# Patient Record
Sex: MALE | Race: WHITE | NOT HISPANIC OR LATINO | Employment: OTHER | URBAN - METROPOLITAN AREA
[De-identification: names, ages, dates, MRNs, and addresses within clinical notes are randomized per-mention and may not be internally consistent; named-entity substitution may affect disease eponyms.]

---

## 2019-09-23 ENCOUNTER — HOSPITAL ENCOUNTER (INPATIENT)
Facility: HOSPITAL | Age: 59
LOS: 2 days | Discharge: HOME | DRG: 394 | End: 2019-09-25
Attending: FAMILY MEDICINE | Admitting: INTERNAL MEDICINE
Payer: MEDICAID

## 2019-09-23 DIAGNOSIS — B18.2 CHRONIC HEPATITIS C WITHOUT HEPATIC COMA: Chronic | ICD-10-CM

## 2019-09-23 DIAGNOSIS — K55.9 ISCHEMIC COLITIS: Chronic | ICD-10-CM

## 2019-09-23 DIAGNOSIS — K92.2 GASTROINTESTINAL HEMORRHAGE, UNSPECIFIED GASTROINTESTINAL HEMORRHAGE TYPE: Primary | ICD-10-CM

## 2019-09-23 DIAGNOSIS — R10.9 ABDOMINAL PAIN: ICD-10-CM

## 2019-09-23 DIAGNOSIS — K92.2 GI BLEED: ICD-10-CM

## 2019-09-23 LAB
ALBUMIN SERPL BCP-MCNC: 1.9 G/DL (ref 3.5–5.2)
ALP SERPL-CCNC: 67 U/L (ref 55–135)
ALT SERPL W/O P-5'-P-CCNC: 15 U/L (ref 10–44)
AMPHET+METHAMPHET UR QL: NEGATIVE
ANION GAP SERPL CALC-SCNC: 7 MMOL/L (ref 8–16)
AST SERPL-CCNC: 12 U/L (ref 10–40)
BARBITURATES UR QL SCN>200 NG/ML: NEGATIVE
BASOPHILS # BLD AUTO: 0.06 K/UL (ref 0–0.2)
BASOPHILS NFR BLD: 0.5 % (ref 0–1.9)
BENZODIAZ UR QL SCN>200 NG/ML: NEGATIVE
BILIRUB SERPL-MCNC: 0.3 MG/DL (ref 0.1–1)
BILIRUB UR QL STRIP: NEGATIVE
BUN SERPL-MCNC: 9 MG/DL (ref 6–20)
BZE UR QL SCN: NEGATIVE
CALCIUM SERPL-MCNC: 7.6 MG/DL (ref 8.7–10.5)
CANNABINOIDS UR QL SCN: NEGATIVE
CHLORIDE SERPL-SCNC: 109 MMOL/L (ref 95–110)
CLARITY UR: CLEAR
CO2 SERPL-SCNC: 24 MMOL/L (ref 23–29)
COLOR UR: YELLOW
CREAT SERPL-MCNC: 0.5 MG/DL (ref 0.5–1.4)
CREAT UR-MCNC: 10.8 MG/DL (ref 23–375)
DIFFERENTIAL METHOD: ABNORMAL
EOSINOPHIL # BLD AUTO: 0.3 K/UL (ref 0–0.5)
EOSINOPHIL NFR BLD: 2.3 % (ref 0–8)
ERYTHROCYTE [DISTWIDTH] IN BLOOD BY AUTOMATED COUNT: 17.1 % (ref 11.5–14.5)
EST. GFR  (AFRICAN AMERICAN): >60 ML/MIN/1.73 M^2
EST. GFR  (NON AFRICAN AMERICAN): >60 ML/MIN/1.73 M^2
GLUCOSE SERPL-MCNC: 57 MG/DL (ref 70–110)
GLUCOSE UR QL STRIP: NEGATIVE
HCT VFR BLD AUTO: 28.5 % (ref 40–54)
HGB BLD-MCNC: 8.5 G/DL (ref 14–18)
HGB UR QL STRIP: NEGATIVE
INR PPP: 1.1 (ref 0.8–1.2)
KETONES UR QL STRIP: NEGATIVE
LACTATE SERPL-SCNC: 1.4 MMOL/L (ref 0.5–2.2)
LEUKOCYTE ESTERASE UR QL STRIP: NEGATIVE
LIPASE SERPL-CCNC: 52 U/L (ref 4–60)
LYMPHOCYTES # BLD AUTO: 3.6 K/UL (ref 1–4.8)
LYMPHOCYTES NFR BLD: 31.9 % (ref 18–48)
MCH RBC QN AUTO: 24.6 PG (ref 27–31)
MCHC RBC AUTO-ENTMCNC: 29.8 G/DL (ref 32–36)
MCV RBC AUTO: 82 FL (ref 82–98)
METHADONE UR QL SCN>300 NG/ML: NEGATIVE
MONOCYTES # BLD AUTO: 1.3 K/UL (ref 0.3–1)
MONOCYTES NFR BLD: 11.6 % (ref 4–15)
NEUTROPHILS # BLD AUTO: 6.1 K/UL (ref 1.8–7.7)
NEUTROPHILS NFR BLD: 53.7 % (ref 38–73)
NITRITE UR QL STRIP: NEGATIVE
OPIATES UR QL SCN: NEGATIVE
PCP UR QL SCN>25 NG/ML: NEGATIVE
PH UR STRIP: 7 [PH] (ref 5–8)
PLATELET # BLD AUTO: 487 K/UL (ref 150–350)
PMV BLD AUTO: 8.8 FL (ref 9.2–12.9)
POTASSIUM SERPL-SCNC: 2.8 MMOL/L (ref 3.5–5.1)
PROT SERPL-MCNC: 6.4 G/DL (ref 6–8.4)
PROT UR QL STRIP: NEGATIVE
PROTHROMBIN TIME: 11.7 SEC (ref 9–12.5)
RBC # BLD AUTO: 3.46 M/UL (ref 4.6–6.2)
SODIUM SERPL-SCNC: 140 MMOL/L (ref 136–145)
SP GR UR STRIP: 1.01 (ref 1–1.03)
TOXICOLOGY INFORMATION: ABNORMAL
URN SPEC COLLECT METH UR: NORMAL
UROBILINOGEN UR STRIP-ACNC: NEGATIVE EU/DL
WBC # BLD AUTO: 11.36 K/UL (ref 3.9–12.7)

## 2019-09-23 PROCEDURE — 86703 HIV-1/HIV-2 1 RESULT ANTBDY: CPT

## 2019-09-23 PROCEDURE — 63600175 PHARM REV CODE 636 W HCPCS: Performed by: FAMILY MEDICINE

## 2019-09-23 PROCEDURE — 11000001 HC ACUTE MED/SURG PRIVATE ROOM

## 2019-09-23 PROCEDURE — 87040 BLOOD CULTURE FOR BACTERIA: CPT | Mod: 59

## 2019-09-23 PROCEDURE — 96361 HYDRATE IV INFUSION ADD-ON: CPT

## 2019-09-23 PROCEDURE — 84484 ASSAY OF TROPONIN QUANT: CPT

## 2019-09-23 PROCEDURE — 93010 ELECTROCARDIOGRAM REPORT: CPT | Mod: ,,, | Performed by: INTERNAL MEDICINE

## 2019-09-23 PROCEDURE — 36415 COLL VENOUS BLD VENIPUNCTURE: CPT

## 2019-09-23 PROCEDURE — 99285 EMERGENCY DEPT VISIT HI MDM: CPT | Mod: 25

## 2019-09-23 PROCEDURE — 84145 PROCALCITONIN (PCT): CPT

## 2019-09-23 PROCEDURE — 85610 PROTHROMBIN TIME: CPT

## 2019-09-23 PROCEDURE — 93005 ELECTROCARDIOGRAM TRACING: CPT

## 2019-09-23 PROCEDURE — 96374 THER/PROPH/DIAG INJ IV PUSH: CPT

## 2019-09-23 PROCEDURE — 83690 ASSAY OF LIPASE: CPT

## 2019-09-23 PROCEDURE — 80053 COMPREHEN METABOLIC PANEL: CPT

## 2019-09-23 PROCEDURE — 80307 DRUG TEST PRSMV CHEM ANLYZR: CPT

## 2019-09-23 PROCEDURE — 81003 URINALYSIS AUTO W/O SCOPE: CPT | Mod: 59

## 2019-09-23 PROCEDURE — 85025 COMPLETE CBC W/AUTO DIFF WBC: CPT

## 2019-09-23 PROCEDURE — 83605 ASSAY OF LACTIC ACID: CPT

## 2019-09-23 PROCEDURE — 82272 OCCULT BLD FECES 1-3 TESTS: CPT

## 2019-09-23 PROCEDURE — 83735 ASSAY OF MAGNESIUM: CPT

## 2019-09-23 PROCEDURE — 93010 EKG 12-LEAD: ICD-10-PCS | Mod: ,,, | Performed by: INTERNAL MEDICINE

## 2019-09-23 RX ORDER — METOCLOPRAMIDE HYDROCHLORIDE 5 MG/ML
10 INJECTION INTRAMUSCULAR; INTRAVENOUS
Status: COMPLETED | OUTPATIENT
Start: 2019-09-23 | End: 2019-09-23

## 2019-09-23 RX ORDER — POTASSIUM CHLORIDE 20 MEQ/15ML
20 SOLUTION ORAL ONCE
Status: COMPLETED | OUTPATIENT
Start: 2019-09-24 | End: 2019-09-24

## 2019-09-23 RX ADMIN — METOCLOPRAMIDE 10 MG: 5 INJECTION, SOLUTION INTRAMUSCULAR; INTRAVENOUS at 11:09

## 2019-09-23 RX ADMIN — SODIUM CHLORIDE 1770 ML: 0.9 INJECTION, SOLUTION INTRAVENOUS at 11:09

## 2019-09-24 PROBLEM — E87.6 HYPOKALEMIA: Status: ACTIVE | Noted: 2019-09-24

## 2019-09-24 PROBLEM — R52 INTRACTABLE PAIN: Status: ACTIVE | Noted: 2017-12-06

## 2019-09-24 PROBLEM — E16.2 HYPOGLYCEMIA: Status: ACTIVE | Noted: 2019-09-24

## 2019-09-24 PROBLEM — K55.9 ISCHEMIC COLITIS: Status: ACTIVE | Noted: 2019-07-01

## 2019-09-24 PROBLEM — S36.119A LIVER INJURY: Status: ACTIVE | Noted: 2019-07-31

## 2019-09-24 PROBLEM — K27.9 PEPTIC ULCER DISEASE: Status: ACTIVE | Noted: 2019-05-15

## 2019-09-24 PROBLEM — Z87.11 HISTORY OF PEPTIC ULCER: Status: ACTIVE | Noted: 2019-09-24

## 2019-09-24 PROBLEM — B18.2 CHRONIC HEPATITIS C WITHOUT HEPATIC COMA: Chronic | Status: ACTIVE | Noted: 2018-01-05

## 2019-09-24 PROBLEM — N13.8 BPH WITH OBSTRUCTION/LOWER URINARY TRACT SYMPTOMS: Status: ACTIVE | Noted: 2017-01-06

## 2019-09-24 PROBLEM — E44.0 MODERATE MALNUTRITION: Status: ACTIVE | Noted: 2019-09-24

## 2019-09-24 PROBLEM — F11.90 HEROIN USE: Status: ACTIVE | Noted: 2019-09-16

## 2019-09-24 PROBLEM — I73.9 PERIPHERAL ARTERIAL DISEASE: Status: ACTIVE | Noted: 2018-08-20

## 2019-09-24 PROBLEM — B18.2 CHRONIC HEPATITIS C WITHOUT HEPATIC COMA: Status: ACTIVE | Noted: 2018-01-05

## 2019-09-24 PROBLEM — N40.1 BPH WITH OBSTRUCTION/LOWER URINARY TRACT SYMPTOMS: Status: ACTIVE | Noted: 2017-01-06

## 2019-09-24 PROBLEM — K55.9 ISCHEMIC COLITIS: Chronic | Status: ACTIVE | Noted: 2019-07-01

## 2019-09-24 PROBLEM — K92.2 GI BLEED: Status: ACTIVE | Noted: 2019-09-24

## 2019-09-24 PROBLEM — G89.4 CHRONIC PAIN SYNDROME: Status: ACTIVE | Noted: 2017-09-11

## 2019-09-24 LAB
ABO + RH BLD: NORMAL
ALBUMIN SERPL BCP-MCNC: 2.1 G/DL (ref 3.5–5.2)
ALP SERPL-CCNC: 66 U/L (ref 55–135)
ALT SERPL W/O P-5'-P-CCNC: 19 U/L (ref 10–44)
ANION GAP SERPL CALC-SCNC: 6 MMOL/L (ref 8–16)
AST SERPL-CCNC: 18 U/L (ref 10–40)
BASOPHILS # BLD AUTO: 0.05 K/UL (ref 0–0.2)
BASOPHILS # BLD AUTO: 0.07 K/UL (ref 0–0.2)
BASOPHILS NFR BLD: 0.5 % (ref 0–1.9)
BASOPHILS NFR BLD: 0.6 % (ref 0–1.9)
BILIRUB SERPL-MCNC: 0.3 MG/DL (ref 0.1–1)
BLD GP AB SCN CELLS X3 SERPL QL: NORMAL
BUN SERPL-MCNC: 8 MG/DL (ref 6–20)
CALCIUM SERPL-MCNC: 7.8 MG/DL (ref 8.7–10.5)
CHLORIDE SERPL-SCNC: 107 MMOL/L (ref 95–110)
CO2 SERPL-SCNC: 26 MMOL/L (ref 23–29)
CREAT SERPL-MCNC: 0.5 MG/DL (ref 0.5–1.4)
DIFFERENTIAL METHOD: ABNORMAL
DIFFERENTIAL METHOD: ABNORMAL
EOSINOPHIL # BLD AUTO: 0.2 K/UL (ref 0–0.5)
EOSINOPHIL # BLD AUTO: 0.2 K/UL (ref 0–0.5)
EOSINOPHIL NFR BLD: 1.4 % (ref 0–8)
EOSINOPHIL NFR BLD: 1.8 % (ref 0–8)
ERYTHROCYTE [DISTWIDTH] IN BLOOD BY AUTOMATED COUNT: 17.4 % (ref 11.5–14.5)
ERYTHROCYTE [DISTWIDTH] IN BLOOD BY AUTOMATED COUNT: 17.6 % (ref 11.5–14.5)
EST. GFR  (AFRICAN AMERICAN): >60 ML/MIN/1.73 M^2
EST. GFR  (NON AFRICAN AMERICAN): >60 ML/MIN/1.73 M^2
GLUCOSE SERPL-MCNC: 76 MG/DL (ref 70–110)
HCT VFR BLD AUTO: 32.5 % (ref 40–54)
HCT VFR BLD AUTO: 34.8 % (ref 40–54)
HGB BLD-MCNC: 10.2 G/DL (ref 14–18)
HGB BLD-MCNC: 9.7 G/DL (ref 14–18)
HIV 1+2 AB+HIV1 P24 AG SERPL QL IA: NEGATIVE
INR PPP: 1 (ref 0.8–1.2)
LACTATE SERPL-SCNC: 1.4 MMOL/L (ref 0.5–2.2)
LYMPHOCYTES # BLD AUTO: 2.6 K/UL (ref 1–4.8)
LYMPHOCYTES # BLD AUTO: 2.9 K/UL (ref 1–4.8)
LYMPHOCYTES NFR BLD: 24.2 % (ref 18–48)
LYMPHOCYTES NFR BLD: 25 % (ref 18–48)
MAGNESIUM SERPL-MCNC: 1.3 MG/DL (ref 1.6–2.6)
MAGNESIUM SERPL-MCNC: 1.3 MG/DL (ref 1.6–2.6)
MCH RBC QN AUTO: 24.2 PG (ref 27–31)
MCH RBC QN AUTO: 24.3 PG (ref 27–31)
MCHC RBC AUTO-ENTMCNC: 29.3 G/DL (ref 32–36)
MCHC RBC AUTO-ENTMCNC: 29.8 G/DL (ref 32–36)
MCV RBC AUTO: 82 FL (ref 82–98)
MCV RBC AUTO: 83 FL (ref 82–98)
MONOCYTES # BLD AUTO: 0.9 K/UL (ref 0.3–1)
MONOCYTES # BLD AUTO: 1 K/UL (ref 0.3–1)
MONOCYTES NFR BLD: 8.3 % (ref 4–15)
MONOCYTES NFR BLD: 8.5 % (ref 4–15)
NEUTROPHILS # BLD AUTO: 7 K/UL (ref 1.8–7.7)
NEUTROPHILS # BLD AUTO: 7.4 K/UL (ref 1.8–7.7)
NEUTROPHILS NFR BLD: 64.6 % (ref 38–73)
NEUTROPHILS NFR BLD: 65.8 % (ref 38–73)
OB PNL STL: POSITIVE
PHOSPHATE SERPL-MCNC: 2.9 MG/DL (ref 2.7–4.5)
PLATELET # BLD AUTO: 378 K/UL (ref 150–350)
PLATELET # BLD AUTO: 392 K/UL (ref 150–350)
PMV BLD AUTO: 8.9 FL (ref 9.2–12.9)
PMV BLD AUTO: 8.9 FL (ref 9.2–12.9)
POCT GLUCOSE: 69 MG/DL (ref 70–110)
POCT GLUCOSE: 77 MG/DL (ref 70–110)
POCT GLUCOSE: 77 MG/DL (ref 70–110)
POCT GLUCOSE: 97 MG/DL (ref 70–110)
POTASSIUM SERPL-SCNC: 3.9 MMOL/L (ref 3.5–5.1)
PROCALCITONIN SERPL IA-MCNC: 0.06 NG/ML
PROT SERPL-MCNC: 6.9 G/DL (ref 6–8.4)
PROTHROMBIN TIME: 10.8 SEC (ref 9–12.5)
RBC # BLD AUTO: 3.99 M/UL (ref 4.6–6.2)
RBC # BLD AUTO: 4.22 M/UL (ref 4.6–6.2)
SODIUM SERPL-SCNC: 139 MMOL/L (ref 136–145)
TROPONIN I SERPL DL<=0.01 NG/ML-MCNC: 0.04 NG/ML (ref 0–0.03)
WBC # BLD AUTO: 10.65 K/UL (ref 3.9–12.7)
WBC # BLD AUTO: 11.5 K/UL (ref 3.9–12.7)

## 2019-09-24 PROCEDURE — 83605 ASSAY OF LACTIC ACID: CPT

## 2019-09-24 PROCEDURE — 25000003 PHARM REV CODE 250: Performed by: INTERNAL MEDICINE

## 2019-09-24 PROCEDURE — 97802 MEDICAL NUTRITION INDIV IN: CPT

## 2019-09-24 PROCEDURE — C9113 INJ PANTOPRAZOLE SODIUM, VIA: HCPCS | Performed by: NURSE PRACTITIONER

## 2019-09-24 PROCEDURE — 99232 SBSQ HOSP IP/OBS MODERATE 35: CPT | Mod: ,,, | Performed by: COLON & RECTAL SURGERY

## 2019-09-24 PROCEDURE — 99232 SBSQ HOSP IP/OBS MODERATE 35: CPT | Mod: ,,, | Performed by: INTERNAL MEDICINE

## 2019-09-24 PROCEDURE — 99232 PR SUBSEQUENT HOSPITAL CARE,LEVL II: ICD-10-PCS | Mod: ,,, | Performed by: INTERNAL MEDICINE

## 2019-09-24 PROCEDURE — 85025 COMPLETE CBC W/AUTO DIFF WBC: CPT | Mod: 91

## 2019-09-24 PROCEDURE — 90471 IMMUNIZATION ADMIN: CPT | Performed by: INTERNAL MEDICINE

## 2019-09-24 PROCEDURE — 99232 PR SUBSEQUENT HOSPITAL CARE,LEVL II: ICD-10-PCS | Mod: ,,, | Performed by: COLON & RECTAL SURGERY

## 2019-09-24 PROCEDURE — 63600175 PHARM REV CODE 636 W HCPCS: Performed by: FAMILY MEDICINE

## 2019-09-24 PROCEDURE — 25000003 PHARM REV CODE 250: Performed by: NURSE PRACTITIONER

## 2019-09-24 PROCEDURE — 20000000 HC ICU ROOM

## 2019-09-24 PROCEDURE — 80053 COMPREHEN METABOLIC PANEL: CPT

## 2019-09-24 PROCEDURE — 84100 ASSAY OF PHOSPHORUS: CPT

## 2019-09-24 PROCEDURE — 90670 PCV13 VACCINE IM: CPT | Performed by: INTERNAL MEDICINE

## 2019-09-24 PROCEDURE — 83735 ASSAY OF MAGNESIUM: CPT

## 2019-09-24 PROCEDURE — 63600175 PHARM REV CODE 636 W HCPCS: Performed by: INTERNAL MEDICINE

## 2019-09-24 PROCEDURE — 25000003 PHARM REV CODE 250: Performed by: FAMILY MEDICINE

## 2019-09-24 PROCEDURE — 94640 AIRWAY INHALATION TREATMENT: CPT

## 2019-09-24 PROCEDURE — 63600175 PHARM REV CODE 636 W HCPCS: Performed by: NURSE PRACTITIONER

## 2019-09-24 PROCEDURE — 36415 COLL VENOUS BLD VENIPUNCTURE: CPT

## 2019-09-24 PROCEDURE — 25000242 PHARM REV CODE 250 ALT 637 W/ HCPCS: Performed by: INTERNAL MEDICINE

## 2019-09-24 PROCEDURE — 86850 RBC ANTIBODY SCREEN: CPT

## 2019-09-24 PROCEDURE — 85610 PROTHROMBIN TIME: CPT

## 2019-09-24 RX ORDER — SPIRONOLACTONE 50 MG/1
50 TABLET, FILM COATED ORAL
Status: ON HOLD | COMMUNITY
Start: 2019-09-16 | End: 2019-09-24 | Stop reason: CLARIF

## 2019-09-24 RX ORDER — OXYCODONE HYDROCHLORIDE 5 MG/1
5 TABLET ORAL EVERY 4 HOURS PRN
Status: DISCONTINUED | OUTPATIENT
Start: 2019-09-24 | End: 2019-09-24

## 2019-09-24 RX ORDER — SODIUM CHLORIDE, SODIUM LACTATE, POTASSIUM CHLORIDE, CALCIUM CHLORIDE 600; 310; 30; 20 MG/100ML; MG/100ML; MG/100ML; MG/100ML
INJECTION, SOLUTION INTRAVENOUS CONTINUOUS
Status: DISCONTINUED | OUTPATIENT
Start: 2019-09-24 | End: 2019-09-24

## 2019-09-24 RX ORDER — MORPHINE SULFATE 4 MG/ML
4 INJECTION, SOLUTION INTRAMUSCULAR; INTRAVENOUS
Status: COMPLETED | OUTPATIENT
Start: 2019-09-24 | End: 2019-09-24

## 2019-09-24 RX ORDER — MISOPROSTOL 200 UG/1
100 TABLET ORAL EVERY 6 HOURS
Status: DISCONTINUED | OUTPATIENT
Start: 2019-09-24 | End: 2019-09-25 | Stop reason: HOSPADM

## 2019-09-24 RX ORDER — ASPIRIN 325 MG/1
100 TABLET, FILM COATED ORAL
COMMUNITY
Start: 2019-08-04

## 2019-09-24 RX ORDER — IPRATROPIUM BROMIDE AND ALBUTEROL SULFATE 2.5; .5 MG/3ML; MG/3ML
3 SOLUTION RESPIRATORY (INHALATION) EVERY 4 HOURS PRN
Status: DISCONTINUED | OUTPATIENT
Start: 2019-09-24 | End: 2019-09-25 | Stop reason: HOSPADM

## 2019-09-24 RX ORDER — ALBUTEROL SULFATE 90 UG/1
2 AEROSOL, METERED RESPIRATORY (INHALATION) EVERY 6 HOURS PRN
COMMUNITY
Start: 2019-09-16

## 2019-09-24 RX ORDER — TAMSULOSIN HYDROCHLORIDE 0.4 MG/1
0.4 CAPSULE ORAL DAILY
COMMUNITY
Start: 2019-09-16

## 2019-09-24 RX ORDER — GABAPENTIN 100 MG/1
200 CAPSULE ORAL 3 TIMES DAILY
Status: DISCONTINUED | OUTPATIENT
Start: 2019-09-24 | End: 2019-09-25 | Stop reason: HOSPADM

## 2019-09-24 RX ORDER — ACETAMINOPHEN 325 MG/1
650 TABLET ORAL EVERY 6 HOURS PRN
Status: DISCONTINUED | OUTPATIENT
Start: 2019-09-24 | End: 2019-09-25 | Stop reason: HOSPADM

## 2019-09-24 RX ORDER — PANTOPRAZOLE SODIUM 40 MG/10ML
40 INJECTION, POWDER, LYOPHILIZED, FOR SOLUTION INTRAVENOUS 2 TIMES DAILY
Status: DISCONTINUED | OUTPATIENT
Start: 2019-09-24 | End: 2019-09-24

## 2019-09-24 RX ORDER — IBUPROFEN 200 MG
24 TABLET ORAL
Status: DISCONTINUED | OUTPATIENT
Start: 2019-09-24 | End: 2019-09-25 | Stop reason: HOSPADM

## 2019-09-24 RX ORDER — OXYCODONE HYDROCHLORIDE 5 MG/1
10 TABLET ORAL EVERY 4 HOURS PRN
Status: DISCONTINUED | OUTPATIENT
Start: 2019-09-24 | End: 2019-09-25 | Stop reason: HOSPADM

## 2019-09-24 RX ORDER — PANTOPRAZOLE SODIUM 40 MG/10ML
80 INJECTION, POWDER, LYOPHILIZED, FOR SOLUTION INTRAVENOUS ONCE
Status: DISCONTINUED | OUTPATIENT
Start: 2019-09-24 | End: 2019-09-24

## 2019-09-24 RX ORDER — PANTOPRAZOLE SODIUM 40 MG/1
40 TABLET, DELAYED RELEASE ORAL DAILY
Status: DISCONTINUED | OUTPATIENT
Start: 2019-09-24 | End: 2019-09-25

## 2019-09-24 RX ORDER — PANTOPRAZOLE SODIUM 40 MG/1
40 TABLET, DELAYED RELEASE ORAL DAILY
COMMUNITY
Start: 2019-09-16 | End: 2020-09-15

## 2019-09-24 RX ORDER — OXYCODONE AND ACETAMINOPHEN 5; 325 MG/1; MG/1
TABLET ORAL
Refills: 0 | Status: ON HOLD | COMMUNITY
Start: 2019-09-14 | End: 2019-09-25 | Stop reason: HOSPADM

## 2019-09-24 RX ORDER — ATORVASTATIN CALCIUM 10 MG/1
20 TABLET, FILM COATED ORAL NIGHTLY
Status: DISCONTINUED | OUTPATIENT
Start: 2019-09-24 | End: 2019-09-25 | Stop reason: HOSPADM

## 2019-09-24 RX ORDER — POTASSIUM CHLORIDE 20 MEQ/15ML
40 SOLUTION ORAL ONCE
Status: DISCONTINUED | OUTPATIENT
Start: 2019-09-24 | End: 2019-09-24

## 2019-09-24 RX ORDER — POTASSIUM CHLORIDE 20 MEQ/15ML
40 SOLUTION ORAL EVERY 4 HOURS
Status: DISPENSED | OUTPATIENT
Start: 2019-09-24 | End: 2019-09-24

## 2019-09-24 RX ORDER — ARFORMOTEROL TARTRATE 15 UG/2ML
15 SOLUTION RESPIRATORY (INHALATION) 2 TIMES DAILY
Status: DISCONTINUED | OUTPATIENT
Start: 2019-09-24 | End: 2019-09-25 | Stop reason: HOSPADM

## 2019-09-24 RX ORDER — TAMSULOSIN HYDROCHLORIDE 0.4 MG/1
0.4 CAPSULE ORAL DAILY
Status: DISCONTINUED | OUTPATIENT
Start: 2019-09-24 | End: 2019-09-25 | Stop reason: HOSPADM

## 2019-09-24 RX ORDER — BUDESONIDE 0.5 MG/2ML
0.5 INHALANT ORAL EVERY 12 HOURS
Status: DISCONTINUED | OUTPATIENT
Start: 2019-09-24 | End: 2019-09-25 | Stop reason: HOSPADM

## 2019-09-24 RX ORDER — GABAPENTIN 100 MG/1
200 CAPSULE ORAL 3 TIMES DAILY
COMMUNITY
Start: 2019-09-16 | End: 2020-03-14

## 2019-09-24 RX ORDER — DULOXETIN HYDROCHLORIDE 20 MG/1
20 CAPSULE, DELAYED RELEASE ORAL
Status: ON HOLD | COMMUNITY
Start: 2019-09-16 | End: 2019-09-24 | Stop reason: CLARIF

## 2019-09-24 RX ORDER — ONDANSETRON 2 MG/ML
4 INJECTION INTRAMUSCULAR; INTRAVENOUS EVERY 6 HOURS PRN
Status: DISCONTINUED | OUTPATIENT
Start: 2019-09-24 | End: 2019-09-25 | Stop reason: HOSPADM

## 2019-09-24 RX ORDER — DULOXETIN HYDROCHLORIDE 20 MG/1
20 CAPSULE, DELAYED RELEASE ORAL DAILY
Status: DISCONTINUED | OUTPATIENT
Start: 2019-09-24 | End: 2019-09-25 | Stop reason: HOSPADM

## 2019-09-24 RX ORDER — THIAMINE HCL 100 MG
100 TABLET ORAL DAILY
Status: DISCONTINUED | OUTPATIENT
Start: 2019-09-24 | End: 2019-09-25 | Stop reason: HOSPADM

## 2019-09-24 RX ORDER — MAGNESIUM SULFATE HEPTAHYDRATE 40 MG/ML
2 INJECTION, SOLUTION INTRAVENOUS ONCE
Status: COMPLETED | OUTPATIENT
Start: 2019-09-24 | End: 2019-09-24

## 2019-09-24 RX ORDER — SULFAMETHOXAZOLE AND TRIMETHOPRIM 400; 80 MG/1; MG/1
TABLET ORAL
Status: ON HOLD | COMMUNITY
Start: 2019-09-05 | End: 2019-09-25 | Stop reason: HOSPADM

## 2019-09-24 RX ORDER — IBUPROFEN 200 MG
16 TABLET ORAL
Status: DISCONTINUED | OUTPATIENT
Start: 2019-09-24 | End: 2019-09-25 | Stop reason: HOSPADM

## 2019-09-24 RX ORDER — MISOPROSTOL 100 UG/1
100 TABLET ORAL EVERY 6 HOURS
Status: DISCONTINUED | OUTPATIENT
Start: 2019-09-24 | End: 2019-09-24

## 2019-09-24 RX ORDER — SPIRONOLACTONE 25 MG/1
50 TABLET ORAL DAILY
Status: DISCONTINUED | OUTPATIENT
Start: 2019-09-24 | End: 2019-09-25 | Stop reason: HOSPADM

## 2019-09-24 RX ORDER — GLUCAGON 1 MG
1 KIT INJECTION
Status: DISCONTINUED | OUTPATIENT
Start: 2019-09-24 | End: 2019-09-25 | Stop reason: HOSPADM

## 2019-09-24 RX ORDER — ATORVASTATIN CALCIUM 20 MG/1
20 TABLET, FILM COATED ORAL
COMMUNITY
Start: 2019-09-16 | End: 2020-09-15

## 2019-09-24 RX ADMIN — PANTOPRAZOLE SODIUM 40 MG: 40 INJECTION, POWDER, FOR SOLUTION INTRAVENOUS at 03:09

## 2019-09-24 RX ADMIN — BUDESONIDE 0.5 MG: 0.5 SUSPENSION RESPIRATORY (INHALATION) at 07:09

## 2019-09-24 RX ADMIN — OXYCODONE HYDROCHLORIDE 10 MG: 5 TABLET ORAL at 07:09

## 2019-09-24 RX ADMIN — MISOPROSTOL 200 MCG: 200 TABLET ORAL at 08:09

## 2019-09-24 RX ADMIN — PNEUMOCOCCAL 13-VALENT CONJUGATE VACCINE 0.5 ML: 2.2; 2.2; 2.2; 2.2; 2.2; 4.4; 2.2; 2.2; 2.2; 2.2; 2.2; 2.2; 2.2 INJECTION, SUSPENSION INTRAMUSCULAR at 05:09

## 2019-09-24 RX ADMIN — SODIUM CHLORIDE, SODIUM LACTATE, POTASSIUM CHLORIDE, AND CALCIUM CHLORIDE: .6; .31; .03; .02 INJECTION, SOLUTION INTRAVENOUS at 03:09

## 2019-09-24 RX ADMIN — OXYCODONE HYDROCHLORIDE 10 MG: 5 TABLET ORAL at 11:09

## 2019-09-24 RX ADMIN — OXYCODONE HYDROCHLORIDE 10 MG: 5 TABLET ORAL at 03:09

## 2019-09-24 RX ADMIN — MORPHINE SULFATE 4 MG: 4 INJECTION, SOLUTION INTRAMUSCULAR; INTRAVENOUS at 12:09

## 2019-09-24 RX ADMIN — MAGNESIUM SULFATE IN WATER 2 G: 40 INJECTION, SOLUTION INTRAVENOUS at 09:09

## 2019-09-24 RX ADMIN — DULOXETINE HYDROCHLORIDE 20 MG: 20 CAPSULE, DELAYED RELEASE ORAL at 09:09

## 2019-09-24 RX ADMIN — ATORVASTATIN CALCIUM 20 MG: 10 TABLET, FILM COATED ORAL at 08:09

## 2019-09-24 RX ADMIN — GABAPENTIN 200 MG: 100 CAPSULE ORAL at 08:09

## 2019-09-24 RX ADMIN — POTASSIUM CHLORIDE 20 MEQ: 20 SOLUTION ORAL at 12:09

## 2019-09-24 RX ADMIN — ARFORMOTEROL TARTRATE 15 MCG: 15 SOLUTION RESPIRATORY (INHALATION) at 07:09

## 2019-09-24 RX ADMIN — Medication 100 MG: at 09:09

## 2019-09-24 RX ADMIN — PANTOPRAZOLE SODIUM 40 MG: 40 TABLET, DELAYED RELEASE ORAL at 09:09

## 2019-09-24 RX ADMIN — POTASSIUM CHLORIDE 40 MEQ: 20 SOLUTION ORAL at 03:09

## 2019-09-24 RX ADMIN — MISOPROSTOL 200 MCG: 200 TABLET ORAL at 11:09

## 2019-09-24 RX ADMIN — GABAPENTIN 200 MG: 100 CAPSULE ORAL at 03:09

## 2019-09-24 RX ADMIN — SPIRONOLACTONE 50 MG: 25 TABLET ORAL at 03:09

## 2019-09-24 RX ADMIN — GABAPENTIN 200 MG: 100 CAPSULE ORAL at 09:09

## 2019-09-24 RX ADMIN — TAMSULOSIN HYDROCHLORIDE 0.4 MG: 0.4 CAPSULE ORAL at 09:09

## 2019-09-24 NOTE — SUBJECTIVE & OBJECTIVE
Past Medical History:   Diagnosis Date    COPD (chronic obstructive pulmonary disease)     GERD (gastroesophageal reflux disease)     History of necrotic bowel 07/2019    Hypercholesteremia     Peptic ulcer     Shoulder pain        Past Surgical History:   Procedure Laterality Date    BACK SURGERY      COLECTOMY  07/2019    COLON SURGERY      COLOSTOMY  07/2019    LUNG BIOPSY      NECK SURGERY      peptic ulcer surgery         Review of patient's allergies indicates:   Allergen Reactions    Asa [aspirin]     Nsaids (non-steroidal anti-inflammatory drug)     Tramadol        No current facility-administered medications on file prior to encounter.      Current Outpatient Medications on File Prior to Encounter   Medication Sig    albuterol (PROVENTIL/VENTOLIN HFA) 90 mcg/actuation inhaler Inhale 2 puffs into the lungs every 6 (six) hours as needed.    atorvastatin (LIPITOR) 20 MG tablet Take 20 mg by mouth.    DULoxetine (CYMBALTA) 20 MG capsule Take 20 mg by mouth.    gabapentin (NEURONTIN) 100 MG capsule Take 200 mg by mouth.    pantoprazole (PROTONIX) 40 MG tablet Take 40 mg by mouth.    spironolactone (ALDACTONE) 50 MG tablet Take 50 mg by mouth.    tamsulosin (FLOMAX) 0.4 mg Cap Take 0.4 mg by mouth.    thiamine mononitrate, vit B1, (VITAMIN B-1, MONONITRATE,) 100 mg Tab Take 100 mg by mouth.    umeclidinium-vilanterol (ANORO ELLIPTA) 62.5-25 mcg/actuation DsDv Inhale 1 puff into the lungs.    fentaNYL (DURAGESIC) 75 mcg/hr Place 1 patch onto the skin every 72 hours.    hydrocodone-acetaminophen 10-325mg (NORCO)  mg Tab Take by mouth.    methocarbamol (ROBAXIN) 750 MG Tab Take 500 mg by mouth 4 (four) times daily.    oxyCODONE-acetaminophen (PERCOCET) 5-325 mg per tablet TK 1 T PO  Q 12 H PRN P FOR UP TO 5 DAYS    sulfamethoxazole-trimethoprim 400-80mg (BACTRIM,SEPTRA) 400-80 mg per tablet     [DISCONTINUED] ALBUTEROL INHL Inhale into the lungs.    [DISCONTINUED] ranitidine  (ZANTAC) 150 MG capsule Take 150 mg by mouth 2 (two) times daily.    [DISCONTINUED] SIMVASTATIN (ZOCOR ORAL) Take by mouth.     Family History     Reviewed and not pertinent.        Tobacco Use    Smoking status: Current Every Day Smoker     Packs/day: 1.00     Types: Cigarettes    Smokeless tobacco: Never Used   Substance and Sexual Activity    Alcohol use: No    Drug use: No    Sexual activity: Not on file     Review of Systems   Constitutional: Positive for diaphoresis. Negative for appetite change, chills, fatigue, fever and unexpected weight change.   HENT: Negative for congestion and sore throat.    Respiratory: Negative for cough, chest tightness, shortness of breath and wheezing.    Cardiovascular: Negative for chest pain, palpitations and leg swelling.   Gastrointestinal: Positive for abdominal pain and blood in stool. Negative for abdominal distention, constipation, diarrhea, nausea, rectal pain and vomiting.   Genitourinary: Negative for decreased urine volume, difficulty urinating, dysuria, flank pain, frequency, hematuria and urgency.   Musculoskeletal: Negative for arthralgias, back pain and myalgias.   Skin: Negative for pallor, rash and wound.   Neurological: Negative for dizziness, syncope, weakness, light-headedness, numbness and headaches.   Psychiatric/Behavioral: Negative for confusion. The patient is not nervous/anxious.    All other systems reviewed and are negative.    Objective:     Vital Signs (Most Recent):  Temp: 99 °F (37.2 °C) (09/23/19 2140)  Pulse: 98 (09/23/19 2346)  Resp: 15 (09/23/19 2346)  BP: 139/85 (09/23/19 2346)  SpO2: 97 % (09/23/19 2346) Vital Signs (24h Range):  Temp:  [99 °F (37.2 °C)] 99 °F (37.2 °C)  Pulse:  [] 98  Resp:  [15-24] 15  SpO2:  [97 %-98 %] 97 %  BP: (139-154)/() 139/85     Weight: 59 kg (130 lb 1.1 oz)  Body mass index is 20.99 kg/m².    Physical Exam   Constitutional: He is oriented to person, place, and time. He appears well-developed and  well-nourished. No distress.   HENT:   Head: Normocephalic and atraumatic.   Eyes: Conjunctivae are normal.   PERRL; EOM intact.   Neck: Normal range of motion. Neck supple.   Cardiovascular: Normal rate, regular rhythm, S1 normal, S2 normal and intact distal pulses.  No extrasystoles are present. Exam reveals no gallop.   No murmur heard.  Pulses:       Radial pulses are 2+ on the right side, and 2+ on the left side.        Dorsalis pedis pulses are 2+ on the right side, and 2+ on the left side.        Posterior tibial pulses are 2+ on the right side, and 2+ on the left side.   Pulmonary/Chest: Effort normal and breath sounds normal. No accessory muscle usage. No tachypnea. No respiratory distress. He has no wheezes. He has no rhonchi. He has no rales.   Abdominal: Soft. Bowel sounds are normal. He exhibits no distension. There is no hepatosplenomegaly. There is tenderness. There is no rigidity, no rebound, no guarding, no CVA tenderness and negative Tolliver's sign. No hernia.   LLQ colostomy with brown stool noted, stoma pink.  Diffuse abdominal tenderness.   Musculoskeletal: Normal range of motion. He exhibits no edema, tenderness or deformity.   Neurological: He is alert and oriented to person, place, and time. No cranial nerve deficit or sensory deficit.   Skin: Skin is warm, dry and intact. Capillary refill takes less than 2 seconds. No rash noted. He is not diaphoretic. No cyanosis or erythema.   Psychiatric: He has a normal mood and affect. His speech is normal and behavior is normal. Cognition and memory are normal.   Nursing note and vitals reviewed.          Significant Labs:  Results for orders placed or performed during the hospital encounter of 09/23/19   HIV 1/2 Ag/Ab (4th Gen)   Result Value Ref Range    HIV 1/2 Ag/Ab Negative Negative   CBC auto differential   Result Value Ref Range    WBC 11.36 3.90 - 12.70 K/uL    RBC 3.46 (L) 4.60 - 6.20 M/uL    Hemoglobin 8.5 (L) 14.0 - 18.0 g/dL    Hematocrit  28.5 (L) 40.0 - 54.0 %    Mean Corpuscular Volume 82 82 - 98 fL    Mean Corpuscular Hemoglobin 24.6 (L) 27.0 - 31.0 pg    Mean Corpuscular Hemoglobin Conc 29.8 (L) 32.0 - 36.0 g/dL    RDW 17.1 (H) 11.5 - 14.5 %    Platelets 487 (H) 150 - 350 K/uL    MPV 8.8 (L) 9.2 - 12.9 fL    Gran # (ANC) 6.1 1.8 - 7.7 K/uL    Lymph # 3.6 1.0 - 4.8 K/uL    Mono # 1.3 (H) 0.3 - 1.0 K/uL    Eos # 0.3 0.0 - 0.5 K/uL    Baso # 0.06 0.00 - 0.20 K/uL    Gran% 53.7 38.0 - 73.0 %    Lymph% 31.9 18.0 - 48.0 %    Mono% 11.6 4.0 - 15.0 %    Eosinophil% 2.3 0.0 - 8.0 %    Basophil% 0.5 0.0 - 1.9 %    Differential Method Automated    Comprehensive metabolic panel   Result Value Ref Range    Sodium 140 136 - 145 mmol/L    Potassium 2.8 (L) 3.5 - 5.1 mmol/L    Chloride 109 95 - 110 mmol/L    CO2 24 23 - 29 mmol/L    Glucose 57 (L) 70 - 110 mg/dL    BUN, Bld 9 6 - 20 mg/dL    Creatinine 0.5 0.5 - 1.4 mg/dL    Calcium 7.6 (L) 8.7 - 10.5 mg/dL    Total Protein 6.4 6.0 - 8.4 g/dL    Albumin 1.9 (L) 3.5 - 5.2 g/dL    Total Bilirubin 0.3 0.1 - 1.0 mg/dL    Alkaline Phosphatase 67 55 - 135 U/L    AST 12 10 - 40 U/L    ALT 15 10 - 44 U/L    Anion Gap 7 (L) 8 - 16 mmol/L    eGFR if African American >60 >60 mL/min/1.73 m^2    eGFR if non African American >60 >60 mL/min/1.73 m^2   Lactic acid, plasma #1   Result Value Ref Range    Lactate (Lactic Acid) 1.4 0.5 - 2.2 mmol/L   Urinalysis, Reflex to Urine Culture Urine, Clean Catch   Result Value Ref Range    Specimen UA Urine, Clean Catch     Color, UA Yellow Yellow, Straw, Rebekah    Appearance, UA Clear Clear    pH, UA 7.0 5.0 - 8.0    Specific Gravity, UA 1.010 1.005 - 1.030    Protein, UA Negative Negative    Glucose, UA Negative Negative    Ketones, UA Negative Negative    Bilirubin (UA) Negative Negative    Occult Blood UA Negative Negative    Nitrite, UA Negative Negative    Urobilinogen, UA Negative <2.0 EU/dL    Leukocytes, UA Negative Negative   Procalcitonin   Result Value Ref Range    Procalcitonin  0.06 <0.25 ng/mL   Lipase   Result Value Ref Range    Lipase 52 4 - 60 U/L   Protime-INR   Result Value Ref Range    Prothrombin Time 11.7 9.0 - 12.5 sec    INR 1.1 0.8 - 1.2   Drug screen panel, emergency   Result Value Ref Range    Benzodiazepines Negative     Methadone metabolites Negative     Cocaine (Metab.) Negative     Opiate Scrn, Ur Negative     Barbiturate Screen, Ur Negative     Amphetamine Screen, Ur Negative     THC Negative     Phencyclidine Negative     Creatinine, Random Ur 10.8 (L) 23.0 - 375.0 mg/dL    Toxicology Information SEE COMMENT    Occult blood x 1, stool   Result Value Ref Range    Occult Blood Positive (A) Negative   Type & Screen   Result Value Ref Range    Group & Rh O POS     Indirect Paul NEG       All pertinent labs within the past 24 hours have been reviewed.    Significant Imaging:  Imaging Results          X-Ray Chest AP Portable (Final result)  Result time 09/23/19 23:36:35    Final result by Glenn Gomez MD (Timothy) (09/23/19 23:36:35)                 Impression:      Mild interstitial scarring noted bilaterally.  No definite acute infiltrates.      Electronically signed by: Glenn Gomez MD  Date:    09/23/2019  Time:    23:36             Narrative:    EXAMINATION:  XR CHEST AP PORTABLE    CLINICAL HISTORY:  <Diagnosis>, Sepsis;    COMPARISON:  None    FINDINGS:  Heart size is normal.  There is mild interstitial scarring seen involving the lungs.  Surgical clips are seen in the mid left lung.  No acute infiltrates.    Previous cervical spine surgery                               CT Abdomen Pelvis  Without Contrast (Final result)  Result time 09/23/19 23:23:40    Final result by Glenn Gomez MD (Timothy) (09/23/19 23:23:40)                 Impression:      There is ostomy identified in the mid abdomen.  No evidence of bowel obstruction.  No definite acute bowel abnormalities.    There may be a gallstone present.  Gallbladder wall may be mildly thickened.  Consider  gallbladder ultrasound.    The liver is enlarged measuring 22 cm in length.  There are couple small hepatic cysts.    There is mild ascites identified around the liver and extending into the pelvis.    There are chronic changes at the lung bases.    All CT scans at this facility use dose modulation, iterative reconstructions, and/or weight base dosing when appropriate to reduce radiation dose to as low as reasonably achievable      Electronically signed by: Glenn Gomez MD  Date:    09/23/2019  Time:    23:23             Narrative:    EXAMINATION:  CT ABDOMEN PELVIS WITHOUT CONTRAST    CLINICAL HISTORY:  Abdominal pain, unspecified;    COMPARISON:  None    FINDINGS:  No lung infiltrates.  There are bullous changes in blebs noted at the lung bases.    Liver appears enlarged.  There are couple of small hepatic cysts.  No definite liver masses.  No focal splenic masses.    No pancreatic masses.    There may be a small gallstone.  Gallbladder wall may be mildly thickened.  No definite bile duct dilatation.    The kidneys are normal. No obstructive uropathy.    There is atherosclerosis of the abdominal aorta.  There appears to be aortic bypass graft present.    Patient has a ostomy identified in the mid abdomen.  No evidence of bowel obstruction.  There is mild ascites around the liver and in the pelvis.    Bladder is normal.    Skeletal structures are intact.                               I have reviewed all pertinent imaging results/findings within the past 24 hours.

## 2019-09-24 NOTE — CONSULTS
[facility] -   Gastroenterology  Consult Note    Patient Name: Arsalan Garcia Jr.  MRN: [id number]  Admission Date: 9/23/2019  Hospital Length of Stay: 0 days  Code Status: Full Code   Attending Provider: Narayan Valadez MD   Consulting Provider: Abiodun Truong MD  Primary Care Physician: Provider Not[employer]  Principal Problem:GI bleed    Inpatient consult to Gastroenterology  Consult performed by: Abiodun Truong MD  Consult ordered by: Dimple Bustillos NP  Reason for consult: GI bleeding        Subjective:     HPI:  Mr. Garcia is a 59 y.o. male w ho recently was diagnosed ischemic colitis s/p sigmoid colectomy with end colostomy creation 7/3/19 at [facility].  He presents to the ED with complaints of abdominal pain around his colostomy that has progressively worsened over the past few days and bleeding around the stoma.  Associated diaphoresis.  He has a history of gastric surgery to patch a perforated ulcer. CT of the abdomen/pelvis without contrast showed ostomy in the mid abdomen with no evidence of bowel obstruction or definite acute bowel abnormalities, an enlarged liver measuring 22 cm in length with a couple small hepatic cysts, and mild ascites identified around the liver and extending into the pelvis. He has a history of drug abuse. He is thin and has no stigmata of chronic liver disease.   I am not certain that he is bleeding internally but more likely around the stoma.       Past Medical History:   Diagnosis Date    COPD (chronic obstructive pulmonary disease)     GERD (gastroesophageal reflux disease)     History of necrotic bowel 07/2019    Hypercholesteremia     Peptic ulcer     Shoulder pain        Past Surgical History:   Procedure Laterality Date    BACK SURGERY      COLECTOMY  07/2019    COLON SURGERY      COLOSTOMY  07/2019    LUNG BIOPSY      NECK SURGERY      peptic ulcer surgery         Review of patient's allergies indicates:   Allergen Reactions    Asa [aspirin]      Nsaids (non-steroidal anti-inflammatory drug)     Tramadol      Family History     None        Tobacco Use    Smoking status: Current Every Day Smoker     Packs/day: 1.00     Types: Cigarettes    Smokeless tobacco: Never Used   Substance and Sexual Activity    Alcohol use: No    Drug use: No    Sexual activity: Not on file     Review of Systems   Constitutional: Positive for activity change, diaphoresis and fatigue. Negative for appetite change, chills, fever and unexpected weight change.   HENT: Positive for dental problem. Negative for hearing loss, mouth sores, nosebleeds, postnasal drip, sinus pressure, sinus pain, trouble swallowing and voice change.    Eyes: Negative for pain, discharge, redness, itching and visual disturbance.   Respiratory: Negative for choking, chest tightness, shortness of breath and wheezing.    Cardiovascular: Negative for chest pain, palpitations and leg swelling.   Gastrointestinal: Positive for abdominal pain and diarrhea. Negative for abdominal distention, anal bleeding and vomiting.   Endocrine: Negative for cold intolerance, heat intolerance and polyphagia.   Genitourinary: Negative for dysuria and hematuria.   Musculoskeletal: Positive for arthralgias and back pain.   Skin: Negative for color change, pallor, rash and wound.   Allergic/Immunologic: Negative for environmental allergies and food allergies.   Neurological: Negative for dizziness, seizures, facial asymmetry, weakness and numbness.   Psychiatric/Behavioral: Negative for agitation, behavioral problems, decreased concentration and suicidal ideas. The patient is nervous/anxious.      Objective:     Vital Signs (Most Recent):  Temp: 98.1 °F (36.7 °C) (09/24/19 0607)  Pulse: 82 (09/24/19 0736)  Resp: 17 (09/24/19 0736)  BP: 120/83 (09/24/19 0607)  SpO2: 98 % (09/24/19 0736) Vital Signs (24h Range):  Temp:  [98.1 °F (36.7 °C)-99 °F (37.2 °C)] 98.1 °F (36.7 °C)  Pulse:  [] 82  Resp:  [15-24] 17  SpO2:  [96 %-98 %]  98 %  BP: (120-154)/() 120/83     Weight: 59 kg (130 lb 1.1 oz) (09/23/19 2323)  Body mass index is 20.99 kg/m².      Intake/Output Summary (Last 24 hours) at 9/24/2019 0841  Last data filed at 9/24/2019 0659  Gross per 24 hour   Intake --   Output 4500 ml   Net -4500 ml       Lines/Drains/Airways     Peripheral Intravenous Line                 Peripheral IV - Single Lumen 09/24/19 0647 18 G Left Antecubital less than 1 day                Physical Exam   Constitutional: He is oriented to person, place, and time. He appears well-developed and well-nourished.   HENT:   Head: Normocephalic and atraumatic.   Eyes: Pupils are equal, round, and reactive to light.   Neck: Normal range of motion. No JVD present. No thyromegaly present.   Cardiovascular: Normal rate.   No murmur heard.  Pulmonary/Chest: Effort normal and breath sounds normal. No stridor. No respiratory distress. He exhibits no tenderness.   Abdominal: Soft. Bowel sounds are normal. He exhibits no distension and no mass. There is hepatosplenomegaly. There is no splenomegaly. There is no tenderness. No hernia.       Musculoskeletal: Normal range of motion.   Lymphadenopathy:     He has no cervical adenopathy.   Neurological: He is alert and oriented to person, place, and time.   Skin: Skin is warm and dry.   Psychiatric: He has a normal mood and affect. His behavior is normal.       Significant Labs:  Recent Lab Results       09/24/19  0326   09/24/19  0324   09/24/19  0022   09/23/19  2345   09/23/19  2303        Benzodiazepines               Methadone metabolites               Phencyclidine               Procalcitonin       0.06  Comment:  A concentration < 0.25 ng/mL represents a low risk bacterial   infection.  Procalcitonin may not be accurate among patients with localized   infection, recent trauma or major surgery, immunosuppressed state,   invasive fungal infection, renal dysfunction. Decisions regarding   initiation or continuation of antibiotic  therapy should not be based   solely on procalcitonin levels.         Albumin   2.1           Alkaline Phosphatase   66           ALT   19           Amphetamine Screen, Ur               Anion Gap   6           Appearance, UA         Clear     AST   18           Barbiturate Screen, Ur               Baso #   0.05           Basophil%   0.5           Bilirubin (UA)         Negative     BILIRUBIN TOTAL   0.3  Comment:  For infants and newborns, interpretation of results should be based  on gestational age, weight and in agreement with clinical  observations.  Premature Infant recommended reference ranges:  Up to 24 hours.............<8.0 mg/dL  Up to 48 hours............<12.0 mg/dL  3-5 days..................<15.0 mg/dL  6-29 days.................<15.0 mg/dL             BUN, Bld   8           Calcium   7.8           Chloride   107           CO2   26           Cocaine (Metab.)               Color, UA         Yellow     Creatinine   0.5           Creatinine, Random Ur               Differential Method   Automated           eGFR if    >60           eGFR if non    >60  Comment:  Calculation used to obtain the estimated glomerular filtration  rate (eGFR) is the CKD-EPI equation.              Eos #   0.2           Eosinophil%   1.4           Glucose   76           Glucose, UA         Negative     Gran # (ANC)   7.0           Gran%   65.8           Group & Rh     O POS         Hematocrit   32.5           Hemoglobin   9.7           HIV 1/2 Ag/Ab               INDIRECT ALEXANDRA     NEG         Coumadin Monitoring INR   1.0  Comment:  Coumadin Therapy:  2.0 - 3.0 for INR for all indicators except mechanical heart valves  and antiphospholipid syndromes which should use 2.5 - 3.5.             Ketones, UA         Negative     Lactate, Theron   1.4  Comment:  Falsely low lactic acid results can be found in samples   containing >=13.0 mg/dL total bilirubin and/or >=3.5 mg/dL   direct bilirubin.              Leukocytes, UA         Negative     Lipase               Lymph #   2.6           Lymph%   24.2           Magnesium   1.3           MCH   24.3           MCHC   29.8           MCV   82           Mono #   0.9           Mono%   8.5           MPV   8.9           NITRITE UA         Negative     Occult Blood               Occult Blood UA         Negative     Opiate Scrn, Ur               pH, UA         7.0     Phosphorus   2.9           Platelets   392           POCT Glucose 77             Potassium   3.9           PROTEIN TOTAL   6.9           Protein, UA         Negative  Comment:  Recommend a 24 hour urine protein or a urine   protein/creatinine ratio if globulin induced proteinuria is  clinically suspected.       Protime   10.8           RBC   3.99           RDW   17.4           Sodium   139           Specific Coleman, UA         1.010     Specimen UA         Urine, Clean Catch     Marijuana (THC) Metabolite               Toxicology Information               Troponin I               UROBILINOGEN UA         Negative     WBC   10.65                            09/23/19  2302        Benzodiazepines Negative     Methadone metabolites Negative     Phencyclidine Negative     Procalcitonin       Albumin 1.9     Alkaline Phosphatase 67     ALT 15     Amphetamine Screen, Ur Negative     Anion Gap 7     Appearance, UA       AST 12     Barbiturate Screen, Ur Negative     Baso # 0.06     Basophil% 0.5     Bilirubin (UA)       BILIRUBIN TOTAL 0.3  Comment:  For infants and newborns, interpretation of results should be based  on gestational age, weight and in agreement with clinical  observations.  Premature Infant recommended reference ranges:  Up to 24 hours.............<8.0 mg/dL  Up to 48 hours............<12.0 mg/dL  3-5 days..................<15.0 mg/dL  6-29 days.................<15.0 mg/dL       BUN, Bld 9     Calcium 7.6     Chloride 109     CO2 24     Cocaine (Metab.) Negative     Color, UA       Creatinine 0.5      Creatinine, Random Ur 10.8  Comment:  The random urine reference ranges provided were established   for 24 hour urine collections.  No reference ranges exist for  random urine specimens.  Correlate clinically.       Differential Method Automated     eGFR if  >60     eGFR if non  >60  Comment:  Calculation used to obtain the estimated glomerular filtration  rate (eGFR) is the CKD-EPI equation.        Eos # 0.3     Eosinophil% 2.3     Glucose 57     Glucose, UA       Gran # (ANC) 6.1     Gran% 53.7     Group & Rh       Hematocrit 28.5     Hemoglobin 8.5     HIV 1/2 Ag/Ab Negative     INDIRECT ALEXANDRA       Coumadin Monitoring INR 1.1  Comment:  Coumadin Therapy:  2.0 - 3.0 for INR for all indicators except mechanical heart valves  and antiphospholipid syndromes which should use 2.5 - 3.5.       Ketones, UA       Lactate, Theron 1.4  Comment:  Falsely low lactic acid results can be found in samples   containing >=13.0 mg/dL total bilirubin and/or >=3.5 mg/dL   direct bilirubin.       Leukocytes, UA       Lipase 52     Lymph # 3.6     Lymph% 31.9     Magnesium 1.3     MCH 24.6     MCHC 29.8     MCV 82     Mono # 1.3     Mono% 11.6     MPV 8.8     NITRITE UA       Occult Blood Positive  Comment:  Corrected result; previously reported as Negative on 09/24/2019 at   00:09.  [C]     Occult Blood UA       Opiate Scrn, Ur Negative     pH, UA       Phosphorus       Platelets 487     POCT Glucose       Potassium 2.8     PROTEIN TOTAL 6.4     Protein, UA       Protime 11.7     RBC 3.46     RDW 17.1     Sodium 140     Specific Gravity, UA       Specimen UA       Marijuana (THC) Metabolite Negative     Toxicology Information SEE COMMENT  Comment:  This screen includes the following classes of drugs at the   listed cut-off:  Benzodiazepines                  200 ng/ml  Methadone                        300 ng/ml  Cocaine metabolite               300 ng/ml  Opiates                          300  ng/ml  Barbiturates                     200 ng/ml  Amphetamines                    1000 ng/ml  Marijuana metabs (THC)            50 ng/ml  Phencyclidine (PCP)               25 ng/ml  High concentrations of Diphenhydramine may cross-react with  Phencyclidine PCP screening immunoassay giving a false   positive result.  High concentrations of Methylenedioxymethamphetamine (MDMA aka  Ectasy) and other structurally similar compounds may cross-   react with the Amphetamine/Methamphetamine screening   immunoassay giving a false positive result.  A metabolite of the anti-HIV drug Sustiva () may cause  false positive results in the Marijuana metabolite (THC)   screening assay.  Note: This exception list includes only more common   interferants in toxicology screen testing.  Because of many   cross-reactantspositive results on toxicology drug screens   should be confirmed whenever results do not correlate with   clinical presentation.  This report is intended for use in clinical monitoring and  management of patients. It is not intended for use in   employment related drug testing.  Because of any cross-reactants, positive results on toxicology  drug screens should be confirmed whenever results do not  correlate with clinical presentation.  Presumptive positive results are unconfirmed and may be used   only for medical purposes.  Assay Intended Use: This assay provides only a preliminary analytical  test result. A more specific alternate chemical method must be used  to obtain a confirmed analytical result. Gas chromatography/mass  spectrometry (GS/MS)is the preferred confirmatory method. Clinical  consideration and professional judgement should be applied to any   drug of abuse test result, particularly when preliminary results  are used.       Troponin I 0.044  Comment:  The reference interval for Troponin I represents the 99th percentile   cutoff   for our facility and is consistent with 3rd generation assay    performance.       UROBILINOGEN UA       WBC 11.36           Significant Imaging:  Imaging results within the past 24 hours have been reviewed.    Assessment/Plan:     * Acute blood loss anemia secondary to GI bleed  I am not certain that bleeding is coming from GI source but more likely from the stoma site. Wound care nurse consulted.   During his July admit to [facility] the following summary was retrieved:  Mr. Garcia is a 59-year-old male with a past medical history significant for hypertension, hyperlipidemia, chronic hepatitis C, COPD, peptic ulcer disease with perforation, AAA status post repair, SALENA/depression, polysubstance abuse who was admitted on 6/17 and ultimately found to have ischemic colitis requiring bowel resection and creation of an ostomy. This was complicated by development of a left retroperitoneal abscess. Patient underwent CT-guided catheter placement of drain and still has 2 PHOEBE drains in place with what appears to be serosanguineous output. Of note his hospital stay has also been complicated by development of gastric ulcer which underwent Endo Clip placement on 7/10. In regards to his wound cultures from his left retroperitoneal abscess he is grown group B strep as well as multiple organisms from the anaerobic cultures suggesting polymicrobial infection. Patient has been on a number of courses of antibiotics since 6/21 including multiple treatment courses of ciprofloxacin and metronidazole. He has been transitioned to amoxicillin clavulanic acid as he is still febrile with a temperature of 100.6 over the last 24 hours. On examination patient has increased tenderness at the site of his incision but otherwise abdominal exam is unremarkable. In regards to his labs his white count is decreasing but he continues to have febrile episodes. I am concerned given his elevated temperature even on appropriate antibiotic therapy that there may not be source control in regards to drainage of his abscess  and there may be some utility and reimaging his abdomen.      We will determine when and if he will need endoscopic work up depending on the clinical course and response to Rx.    Patient did not have a CTA on admit.     GI Pathway is in place.     History of ischemic colitis s/p sigmoid colectomy with end colostomy creation 7/3/19  Peristoma irritation that appeared to be the source of bleeding. I recommend consulting wound care.     New Orders to be Acknowledged     Inpatient consult to Wound Care   Once, Standing Count: 1 Occurrences, Prio: Routine   When 09/24/19 0835    Ordering Provider Abiodun Truong MD          pantoprazole EC tablet 40 mg   Start: 09/24/19, 40 mg, Oral, Daily, Status: Verified   When 09/24/19 0835    Ordering Provider Abiodun Truong MD          Diet clear liquid   Diet effective now, Until Specified, Prio: Routine   When 09/24/19 0835    Ordering Provider Abiodun Truong MD          New Discontinued Orders to be Acknowledged     promethazine (PHENERGAN) 6.25 mg in dextrose 5 % 50 mL IVPB   Start: 09/24/19, End: 09/24/19, 6.25 mg, Intravenous, Every 6 hours PRN, Status: Discontinued   When 09/24/19 0835    Discontinuing Provider Abiodun Truong MD          pantoprazole injection 40 mg   Start: 09/24/19, End: 09/24/19, 40 mg, Intravenous, 2 times daily, Status: Discontinued   When 09/24/19 0835    Discontinuing Provider Abiodun Truong MD          oxyCODONE immediate release tablet 5 mg   Start: 09/24/19, End: 09/24/19, 5 mg, Oral, Every 4 hours PRN, Status: Discontinued   When 09/24/19 0835    Discontinuing Provider Abiodun Truong MD          Diet NPO Except for: Sips with Medication   Diet effective now, Until Specified, Prio: Routine, Status: Canceled   When 09/24/19 0835    Discontinuing Provider Abiodun Truong MD                Chronic hepatitis C without hepatic coma  MELD-Na score: 6 at 9/24/2019  3:24 AM  MELD score: 6 at 9/24/2019  3:24 AM  Calculated from:  Serum Creatinine: 0.5 mg/dL  (Rounded to 1 mg/dL) at 9/24/2019  3:24 AM  Serum Sodium: 139 mmol/L (Rounded to 137 mmol/L) at 9/24/2019  3:24 AM  Total Bilirubin: 0.3 mg/dL (Rounded to 1 mg/dL) at 9/24/2019  3:24 AM  INR(ratio): 1.0 at 9/24/2019  3:24 AM  Age: 59 years    Patient has mild ascites on CT scan but it could be fluid from his prior surgical intervention. No evidence or stigmata of chronic liver disease and no evident portal hypertension. CT Scan was done without contrast so it is a poor study to evaluate liver and GI anatomy.     Component Name Value Ref Range   HCV RNA Quantification Interpretation Detected (A) Undetected    HCV RNA Quantification 317 IU/mL   HCV RNA Quantification Log Value 2.50     Specimen Collected on   Blood - Vein 7/31/2019 9:06 PM   Result Narrative     Patient will need outpatient therapy, referral to [facility].     History of peptic ulcer  A recent EGD revealed a gastric ulcer with visible artery that was clipped. Depending on his clinical course will consider repeating an EGD before discharge.         Thank you for your consult. I will follow-up with patient. Please contact us if you have any additional questions.    Abiodun Truong MD  Gastroenterology  [facility] -

## 2019-09-24 NOTE — PLAN OF CARE
Recommendations     Recommendation: 1. Consider ST eval. 2. When medically able, ADAT to GI soft/ Low residue. 3. Add boost breeze TID. 4. If unable to adv diet to at least full liquids within the next 72-96 hrs consider alternate nutrition support. 4. RD to f/u.   Goals: Meet > 85 % EEN/EPN while admitted  Nutrition Goal Status: new  Communication of RD Recs: (POC, sticky note)

## 2019-09-24 NOTE — NURSING
Attempted to review home meds with patient at this time. Pt states he is unsure of what all he takes at home. He does not have a list of his medications in his belongings either.

## 2019-09-24 NOTE — ASSESSMENT & PLAN NOTE
Peristoma irritation that appeared to be the source of bleeding. I recommend consulting wound care.     New Orders to be Acknowledged     Inpatient consult to Wound Care   Once, Standing Count: 1 Occurrences, Prio: Routine   When 09/24/19 0835    Ordering Provider Abiodun Truong MD          pantoprazole EC tablet 40 mg   Start: 09/24/19, 40 mg, Oral, Daily, Status: Verified   When 09/24/19 0835    Ordering Provider Abiodun Truong MD          Diet clear liquid   Diet effective now, Until Specified, Prio: Routine   When 09/24/19 0835    Ordering Provider Abiodun Truong MD          New Discontinued Orders to be Acknowledged     promethazine (PHENERGAN) 6.25 mg in dextrose 5 % 50 mL IVPB   Start: 09/24/19, End: 09/24/19, 6.25 mg, Intravenous, Every 6 hours PRN, Status: Discontinued   When 09/24/19 0835    Discontinuing Provider Abiodun Truong MD          pantoprazole injection 40 mg   Start: 09/24/19, End: 09/24/19, 40 mg, Intravenous, 2 times daily, Status: Discontinued   When 09/24/19 0835    Discontinuing Provider Abiodun Truong MD          oxyCODONE immediate release tablet 5 mg   Start: 09/24/19, End: 09/24/19, 5 mg, Oral, Every 4 hours PRN, Status: Discontinued   When 09/24/19 0835    Discontinuing Provider Abiodun Truong MD          Diet NPO Except for: Sips with Medication   Diet effective now, Until Specified, Prio: Routine, Status: Canceled   When 09/24/19 0835    Discontinuing Provider Abiodun Truong MD

## 2019-09-24 NOTE — PLAN OF CARE
CM met with patient at bedside to assess for discharge needs. Pt states that he lives at home alone and is dependent with a few needs. He uses a cane, walker and colostomy/ostomy supplies. He stated that he needs assistance at home and home health. His DC plan will be dependent on hospital progress at this time. CM provided a transitional care folder, information on advanced directives, information on pharmacy bedside delivery, and discharge planning begins on admission with contact information for any needs/questions.    D/C Plan: Home with possible HH  PCP: None listed  Preferred Pharmacy: [facility]  Discharge transportation: Family  [facility] [place]: Send link  Pharmacy Bedside Delivery: Yes       09/24/19 7157   Discharge Assessment   Assessment Type Discharge Planning Assessment   Confirmed/corrected address and phone number on facesheet? Yes   Assessment information obtained from? Patient   Expected Length of Stay (days)   (TBD)   Communicated expected length of stay with patient/caregiver yes   Prior to hospitilization cognitive status: Alert/Oriented   Prior to hospitalization functional status: Independent;Assistive Equipment   Current cognitive status: Alert/Oriented   Current Functional Status: Independent;Assistive Equipment   Facility Arrived From: Home   Lives With alone   Able to Return to Prior Arrangements yes   Is patient able to care for self after discharge? Unable to determine at this time (comments)   Who are your caregiver(s) and their phone number(s)? Julio Jose, Father- [phone]   Patient's perception of discharge disposition home or selfcare;home health   Readmission Within the Last 30 Days no previous admission in last 30 days   Patient currently being followed by outpatient case management? No   Patient currently receives any other outside agency services? No   Equipment Currently Used at Home colostomy/ostomy supplies;cane, straight;walker, standard   Do you have any problems  affording any of your prescribed medications? No   Is the patient taking medications as prescribed? yes   Does the patient have transportation home? Yes   Transportation Anticipated family or friend will provide   Dialysis Name and Scheduled days NA   Does the patient receive services at the Coumadin Clinic? No   Discharge Plan A Home Health;Home   DME Needed Upon Discharge  none   Patient/Family in Agreement with Plan yes

## 2019-09-24 NOTE — H&P
[facility] Hospital Medicine  History & Physical    Patient Name: Arsalan Garcia Jr.  MRN: [id number]  Admission Date: 9/23/2019  Attending Physician: Baldev Monson MD   Primary Care Provider: Provider [employer]         Patient information was obtained from patient, past medical records and ER records.     Subjective:     Principal Problem:GI bleed    Chief Complaint:   Chief Complaint   Patient presents with    Abdominal Pain     blood in colostomy. Emptied 4 times today.        HPI: Mr. Garcia is a 59 y.o. male with  a PMHx of COPD, HLD, PUD, Hep C, chronic pain syndrome, tobacco use and recent h/o ischemic colitis s/p sigmoid colectomy with end colostomy creation 7/3/19 at [facility].  He presented to the ED with complaints of abdominal pain around his colostomy that has progressively worsened over the past few days.  Associated dark red blood noted in his colostomy bag today, and diaphoresis.  No aggravating or alleviating factors.  Denies any abdominal distention, decreased appetite, nausea, vomiting, diarrhea, constipation, hematemesis, dysuria, hematuria, back pain, chest pain, shortness of breath, lightheadedness or dizziness, syncope, weakness, fever or chills.  Initial workup in the ED resulted H&H of 8.5/28.5, platelets 487, BUN 9, creatinine 0.5, potassium 2.8, albumin 1.9, glucose 57, lactic 1.4, Hemoccult positive, unremarkable UA and CXR.  CT of the abdomen/pelvis showed ostomy in the mid abdomen with no evidence of bowel obstruction or definite acute bowel abnormalities, there may be a gallstone present with mild gallbladder wall thickening, enlarged liver measuring 22 cm in length with a couple small hepatic cysts, and mild ascites identified around the liver and extending into the pelvis.  Patient noted to be mildly tachycardic in the ED with heart rate max of 111 bpm, remained hemodynamically stable.  He received IV fluid resuscitation, IV Reglan and morphine in the ED.   Hospital Medicine was called for admission.      Past Medical History:   Diagnosis Date    COPD (chronic obstructive pulmonary disease)     GERD (gastroesophageal reflux disease)     History of necrotic bowel 07/2019    Hypercholesteremia     Peptic ulcer     Shoulder pain        Past Surgical History:   Procedure Laterality Date    BACK SURGERY      COLECTOMY  07/2019    COLON SURGERY      COLOSTOMY  07/2019    LUNG BIOPSY      NECK SURGERY      peptic ulcer surgery         Review of patient's allergies indicates:   Allergen Reactions    Asa [aspirin]     Nsaids (non-steroidal anti-inflammatory drug)     Tramadol        No current facility-administered medications on file prior to encounter.      Current Outpatient Medications on File Prior to Encounter   Medication Sig    albuterol (PROVENTIL/VENTOLIN HFA) 90 mcg/actuation inhaler Inhale 2 puffs into the lungs every 6 (six) hours as needed.    atorvastatin (LIPITOR) 20 MG tablet Take 20 mg by mouth.    DULoxetine (CYMBALTA) 20 MG capsule Take 20 mg by mouth.    gabapentin (NEURONTIN) 100 MG capsule Take 200 mg by mouth.    pantoprazole (PROTONIX) 40 MG tablet Take 40 mg by mouth.    spironolactone (ALDACTONE) 50 MG tablet Take 50 mg by mouth.    tamsulosin (FLOMAX) 0.4 mg Cap Take 0.4 mg by mouth.    thiamine mononitrate, vit B1, (VITAMIN B-1, MONONITRATE,) 100 mg Tab Take 100 mg by mouth.    umeclidinium-vilanterol (ANORO ELLIPTA) 62.5-25 mcg/actuation DsDv Inhale 1 puff into the lungs.    fentaNYL (DURAGESIC) 75 mcg/hr Place 1 patch onto the skin every 72 hours.    hydrocodone-acetaminophen 10-325mg (NORCO)  mg Tab Take by mouth.    methocarbamol (ROBAXIN) 750 MG Tab Take 500 mg by mouth 4 (four) times daily.    oxyCODONE-acetaminophen (PERCOCET) 5-325 mg per tablet TK 1 T PO  Q 12 H PRN P FOR UP TO 5 DAYS    sulfamethoxazole-trimethoprim 400-80mg (BACTRIM,SEPTRA) 400-80 mg per tablet     [DISCONTINUED] ALBUTEROL INHL Inhale  into the lungs.    [DISCONTINUED] ranitidine (ZANTAC) 150 MG capsule Take 150 mg by mouth 2 (two) times daily.    [DISCONTINUED] SIMVASTATIN (ZOCOR ORAL) Take by mouth.     Family History     Reviewed and not pertinent.        Tobacco Use    Smoking status: Current Every Day Smoker     Packs/day: 1.00     Types: Cigarettes    Smokeless tobacco: Never Used   Substance and Sexual Activity    Alcohol use: No    Drug use: No    Sexual activity: Not on file     Review of Systems   Constitutional: Positive for diaphoresis. Negative for appetite change, chills, fatigue, fever and unexpected weight change.   HENT: Negative for congestion and sore throat.    Respiratory: Negative for cough, chest tightness, shortness of breath and wheezing.    Cardiovascular: Negative for chest pain, palpitations and leg swelling.   Gastrointestinal: Positive for abdominal pain and blood in stool. Negative for abdominal distention, constipation, diarrhea, nausea, rectal pain and vomiting.   Genitourinary: Negative for decreased urine volume, difficulty urinating, dysuria, flank pain, frequency, hematuria and urgency.   Musculoskeletal: Negative for arthralgias, back pain and myalgias.   Skin: Negative for pallor, rash and wound.   Neurological: Negative for dizziness, syncope, weakness, light-headedness, numbness and headaches.   Psychiatric/Behavioral: Negative for confusion. The patient is not nervous/anxious.    All other systems reviewed and are negative.    Objective:     Vital Signs (Most Recent):  Temp: 99 °F (37.2 °C) (09/23/19 2140)  Pulse: 98 (09/23/19 2346)  Resp: 15 (09/23/19 2346)  BP: 139/85 (09/23/19 2346)  SpO2: 97 % (09/23/19 2346) Vital Signs (24h Range):  Temp:  [99 °F (37.2 °C)] 99 °F (37.2 °C)  Pulse:  [] 98  Resp:  [15-24] 15  SpO2:  [97 %-98 %] 97 %  BP: (139-154)/() 139/85     Weight: 59 kg (130 lb 1.1 oz)  Body mass index is 20.99 kg/m².    Physical Exam   Constitutional: He is oriented to person,  place, and time. He appears well-developed and well-nourished. No distress.   HENT:   Head: Normocephalic and atraumatic.   Eyes: Conjunctivae are normal.   PERRL; EOM intact.   Neck: Normal range of motion. Neck supple.   Cardiovascular: Normal rate, regular rhythm, S1 normal, S2 normal and intact distal pulses.  No extrasystoles are present. Exam reveals no gallop.   No murmur heard.  Pulses:       Radial pulses are 2+ on the right side, and 2+ on the left side.        Dorsalis pedis pulses are 2+ on the right side, and 2+ on the left side.        Posterior tibial pulses are 2+ on the right side, and 2+ on the left side.   Pulmonary/Chest: Effort normal and breath sounds normal. No accessory muscle usage. No tachypnea. No respiratory distress. He has no wheezes. He has no rhonchi. He has no rales.   Abdominal: Soft. Bowel sounds are normal. He exhibits no distension. There is no hepatosplenomegaly. There is tenderness. There is no rigidity, no rebound, no guarding, no CVA tenderness and negative Tolliver's sign. No hernia.   LLQ colostomy with brown stool noted, stoma pink.  Diffuse abdominal tenderness.   Musculoskeletal: Normal range of motion. He exhibits no edema, tenderness or deformity.   Neurological: He is alert and oriented to person, place, and time. No cranial nerve deficit or sensory deficit.   Skin: Skin is warm, dry and intact. Capillary refill takes less than 2 seconds. No rash noted. He is not diaphoretic. No cyanosis or erythema.   Psychiatric: He has a normal mood and affect. His speech is normal and behavior is normal. Cognition and memory are normal.   Nursing note and vitals reviewed.          Significant Labs:  Results for orders placed or performed during the hospital encounter of 09/23/19   HIV 1/2 Ag/Ab (4th Gen)   Result Value Ref Range    HIV 1/2 Ag/Ab Negative Negative   CBC auto differential   Result Value Ref Range    WBC 11.36 3.90 - 12.70 K/uL    RBC 3.46 (L) 4.60 - 6.20 M/uL     Hemoglobin 8.5 (L) 14.0 - 18.0 g/dL    Hematocrit 28.5 (L) 40.0 - 54.0 %    Mean Corpuscular Volume 82 82 - 98 fL    Mean Corpuscular Hemoglobin 24.6 (L) 27.0 - 31.0 pg    Mean Corpuscular Hemoglobin Conc 29.8 (L) 32.0 - 36.0 g/dL    RDW 17.1 (H) 11.5 - 14.5 %    Platelets 487 (H) 150 - 350 K/uL    MPV 8.8 (L) 9.2 - 12.9 fL    Gran # (ANC) 6.1 1.8 - 7.7 K/uL    Lymph # 3.6 1.0 - 4.8 K/uL    Mono # 1.3 (H) 0.3 - 1.0 K/uL    Eos # 0.3 0.0 - 0.5 K/uL    Baso # 0.06 0.00 - 0.20 K/uL    Gran% 53.7 38.0 - 73.0 %    Lymph% 31.9 18.0 - 48.0 %    Mono% 11.6 4.0 - 15.0 %    Eosinophil% 2.3 0.0 - 8.0 %    Basophil% 0.5 0.0 - 1.9 %    Differential Method Automated    Comprehensive metabolic panel   Result Value Ref Range    Sodium 140 136 - 145 mmol/L    Potassium 2.8 (L) 3.5 - 5.1 mmol/L    Chloride 109 95 - 110 mmol/L    CO2 24 23 - 29 mmol/L    Glucose 57 (L) 70 - 110 mg/dL    BUN, Bld 9 6 - 20 mg/dL    Creatinine 0.5 0.5 - 1.4 mg/dL    Calcium 7.6 (L) 8.7 - 10.5 mg/dL    Total Protein 6.4 6.0 - 8.4 g/dL    Albumin 1.9 (L) 3.5 - 5.2 g/dL    Total Bilirubin 0.3 0.1 - 1.0 mg/dL    Alkaline Phosphatase 67 55 - 135 U/L    AST 12 10 - 40 U/L    ALT 15 10 - 44 U/L    Anion Gap 7 (L) 8 - 16 mmol/L    eGFR if African American >60 >60 mL/min/1.73 m^2    eGFR if non African American >60 >60 mL/min/1.73 m^2   Lactic acid, plasma #1   Result Value Ref Range    Lactate (Lactic Acid) 1.4 0.5 - 2.2 mmol/L   Urinalysis, Reflex to Urine Culture Urine, Clean Catch   Result Value Ref Range    Specimen UA Urine, Clean Catch     Color, UA Yellow Yellow, Straw, Rebekah    Appearance, UA Clear Clear    pH, UA 7.0 5.0 - 8.0    Specific Gravity, UA 1.010 1.005 - 1.030    Protein, UA Negative Negative    Glucose, UA Negative Negative    Ketones, UA Negative Negative    Bilirubin (UA) Negative Negative    Occult Blood UA Negative Negative    Nitrite, UA Negative Negative    Urobilinogen, UA Negative <2.0 EU/dL    Leukocytes, UA Negative Negative    Procalcitonin   Result Value Ref Range    Procalcitonin 0.06 <0.25 ng/mL   Lipase   Result Value Ref Range    Lipase 52 4 - 60 U/L   Protime-INR   Result Value Ref Range    Prothrombin Time 11.7 9.0 - 12.5 sec    INR 1.1 0.8 - 1.2   Drug screen panel, emergency   Result Value Ref Range    Benzodiazepines Negative     Methadone metabolites Negative     Cocaine (Metab.) Negative     Opiate Scrn, Ur Negative     Barbiturate Screen, Ur Negative     Amphetamine Screen, Ur Negative     THC Negative     Phencyclidine Negative     Creatinine, Random Ur 10.8 (L) 23.0 - 375.0 mg/dL    Toxicology Information SEE COMMENT    Occult blood x 1, stool   Result Value Ref Range    Occult Blood Positive (A) Negative   Type & Screen   Result Value Ref Range    Group & Rh O POS     Indirect Paul NEG       All pertinent labs within the past 24 hours have been reviewed.    Significant Imaging:  Imaging Results          X-Ray Chest AP Portable (Final result)  Result time 09/23/19 23:36:35    Final result by Glenn Gomez MD (Timothy) (09/23/19 23:36:35)                 Impression:      Mild interstitial scarring noted bilaterally.  No definite acute infiltrates.      Electronically signed by: Glenn Gomez MD  Date:    09/23/2019  Time:    23:36             Narrative:    EXAMINATION:  XR CHEST AP PORTABLE    CLINICAL HISTORY:  <Diagnosis>, Sepsis;    COMPARISON:  None    FINDINGS:  Heart size is normal.  There is mild interstitial scarring seen involving the lungs.  Surgical clips are seen in the mid left lung.  No acute infiltrates.    Previous cervical spine surgery                               CT Abdomen Pelvis  Without Contrast (Final result)  Result time 09/23/19 23:23:40    Final result by Glenn Gomez MD (Timothy) (09/23/19 23:23:40)                 Impression:      There is ostomy identified in the mid abdomen.  No evidence of bowel obstruction.  No definite acute bowel abnormalities.    There may be a gallstone  present.  Gallbladder wall may be mildly thickened.  Consider gallbladder ultrasound.    The liver is enlarged measuring 22 cm in length.  There are couple small hepatic cysts.    There is mild ascites identified around the liver and extending into the pelvis.    There are chronic changes at the lung bases.    All CT scans at this facility use dose modulation, iterative reconstructions, and/or weight base dosing when appropriate to reduce radiation dose to as low as reasonably achievable      Electronically signed by: Glenn Gomez MD  Date:    09/23/2019  Time:    23:23             Narrative:    EXAMINATION:  CT ABDOMEN PELVIS WITHOUT CONTRAST    CLINICAL HISTORY:  Abdominal pain, unspecified;    COMPARISON:  None    FINDINGS:  No lung infiltrates.  There are bullous changes in blebs noted at the lung bases.    Liver appears enlarged.  There are couple of small hepatic cysts.  No definite liver masses.  No focal splenic masses.    No pancreatic masses.    There may be a small gallstone.  Gallbladder wall may be mildly thickened.  No definite bile duct dilatation.    The kidneys are normal. No obstructive uropathy.    There is atherosclerosis of the abdominal aorta.  There appears to be aortic bypass graft present.    Patient has a ostomy identified in the mid abdomen.  No evidence of bowel obstruction.  There is mild ascites around the liver and in the pelvis.    Bladder is normal.    Skeletal structures are intact.                               I have reviewed all pertinent imaging results/findings within the past 24 hours.                 Assessment/Plan:     * Acute blood loss anemia secondary to GI bleed  - Initial H&H 8.5/28.5, asymptomatic and hemodynamically stable.  Repeat H&H Q8 hours and transfuse as needed.  - IV Protonix 40 mg BID.  - IV hydration.  - Keep NPO.  - GI consult in AM.    Hypokalemia  - Initial potassium of 2.8, will replete to keep >/= 4.  - Check Mg.    Hypoglycemia  - Initial glucose  of 57.  Patient denies any history of diabetes/hypoglycemia.  - Will monitor Accu-Cheks Q 6 hours with hypoglycemic protocol as needed.    Tobacco use  - Counseled on cessation.  - Nicotine patch.    History of ischemic colitis s/p sigmoid colectomy with end colostomy creation 7/3/19  - Patient now with acute GI bleed.  See plan above.  - CT of the abdomen/pelvis was negative for acute bowel abnormalities.  - Wound care consult for ostomy management.  - GI consulted.    Chronic hepatitis C without hepatic coma  - LFTs stable.  - Hold home spironolactone given acute GI bleed/IVVD.      VTE Risk Mitigation (From admission, onward)         Ordered     Place sequential compression device  Until discontinued      09/24/19 0223     IP VTE LOW RISK PATIENT  Once      09/24/19 0223                   Dimple Bustillos NP  Department of Hospital Medicine   [facility]

## 2019-09-24 NOTE — ASSESSMENT & PLAN NOTE
- Initial glucose of 57.  Patient denies any history of diabetes/hypoglycemia.  - Will monitor Accu-Cheks Q 6 hours with hypoglycemic protocol as needed.

## 2019-09-24 NOTE — SUBJECTIVE & OBJECTIVE
Past Medical History:   Diagnosis Date    COPD (chronic obstructive pulmonary disease)     GERD (gastroesophageal reflux disease)     History of necrotic bowel 07/2019    Hypercholesteremia     Peptic ulcer     Shoulder pain        Past Surgical History:   Procedure Laterality Date    BACK SURGERY      COLECTOMY  07/2019    COLON SURGERY      COLOSTOMY  07/2019    LUNG BIOPSY      NECK SURGERY      peptic ulcer surgery         Review of patient's allergies indicates:   Allergen Reactions    Asa [aspirin]     Nsaids (non-steroidal anti-inflammatory drug)     Tramadol      Family History     None        Tobacco Use    Smoking status: Current Every Day Smoker     Packs/day: 1.00     Types: Cigarettes    Smokeless tobacco: Never Used   Substance and Sexual Activity    Alcohol use: No    Drug use: No    Sexual activity: Not on file     Review of Systems   Constitutional: Positive for activity change, diaphoresis and fatigue. Negative for appetite change, chills, fever and unexpected weight change.   HENT: Positive for dental problem. Negative for hearing loss, mouth sores, nosebleeds, postnasal drip, sinus pressure, sinus pain, trouble swallowing and voice change.    Eyes: Negative for pain, discharge, redness, itching and visual disturbance.   Respiratory: Negative for choking, chest tightness, shortness of breath and wheezing.    Cardiovascular: Negative for chest pain, palpitations and leg swelling.   Gastrointestinal: Positive for abdominal pain and diarrhea. Negative for abdominal distention, anal bleeding and vomiting.   Endocrine: Negative for cold intolerance, heat intolerance and polyphagia.   Genitourinary: Negative for dysuria and hematuria.   Musculoskeletal: Positive for arthralgias and back pain.   Skin: Negative for color change, pallor, rash and wound.   Allergic/Immunologic: Negative for environmental allergies and food allergies.   Neurological: Negative for dizziness, seizures,  facial asymmetry, weakness and numbness.   Psychiatric/Behavioral: Negative for agitation, behavioral problems, decreased concentration and suicidal ideas. The patient is nervous/anxious.      Objective:     Vital Signs (Most Recent):  Temp: 98.1 °F (36.7 °C) (09/24/19 0607)  Pulse: 82 (09/24/19 0736)  Resp: 17 (09/24/19 0736)  BP: 120/83 (09/24/19 0607)  SpO2: 98 % (09/24/19 0736) Vital Signs (24h Range):  Temp:  [98.1 °F (36.7 °C)-99 °F (37.2 °C)] 98.1 °F (36.7 °C)  Pulse:  [] 82  Resp:  [15-24] 17  SpO2:  [96 %-98 %] 98 %  BP: (120-154)/() 120/83     Weight: 59 kg (130 lb 1.1 oz) (09/23/19 2323)  Body mass index is 20.99 kg/m².      Intake/Output Summary (Last 24 hours) at 9/24/2019 0841  Last data filed at 9/24/2019 0659  Gross per 24 hour   Intake --   Output 4500 ml   Net -4500 ml       Lines/Drains/Airways     Peripheral Intravenous Line                 Peripheral IV - Single Lumen 09/24/19 0647 18 G Left Antecubital less than 1 day                Physical Exam   Constitutional: He is oriented to person, place, and time. He appears well-developed and well-nourished.   HENT:   Head: Normocephalic and atraumatic.   Eyes: Pupils are equal, round, and reactive to light.   Neck: Normal range of motion. No JVD present. No thyromegaly present.   Cardiovascular: Normal rate.   No murmur heard.  Pulmonary/Chest: Effort normal and breath sounds normal. No stridor. No respiratory distress. He exhibits no tenderness.   Abdominal: Soft. Bowel sounds are normal. He exhibits no distension and no mass. There is hepatosplenomegaly. There is no splenomegaly. There is no tenderness. No hernia.       Musculoskeletal: Normal range of motion.   Lymphadenopathy:     He has no cervical adenopathy.   Neurological: He is alert and oriented to person, place, and time.   Skin: Skin is warm and dry.   Psychiatric: He has a normal mood and affect. His behavior is normal.       Significant Labs:  Recent Lab Results        09/24/19  0326   09/24/19  0324   09/24/19  0022   09/23/19  2345   09/23/19  2303        Benzodiazepines               Methadone metabolites               Phencyclidine               Procalcitonin       0.06  Comment:  A concentration < 0.25 ng/mL represents a low risk bacterial   infection.  Procalcitonin may not be accurate among patients with localized   infection, recent trauma or major surgery, immunosuppressed state,   invasive fungal infection, renal dysfunction. Decisions regarding   initiation or continuation of antibiotic therapy should not be based   solely on procalcitonin levels.         Albumin   2.1           Alkaline Phosphatase   66           ALT   19           Amphetamine Screen, Ur               Anion Gap   6           Appearance, UA         Clear     AST   18           Barbiturate Screen, Ur               Baso #   0.05           Basophil%   0.5           Bilirubin (UA)         Negative     BILIRUBIN TOTAL   0.3  Comment:  For infants and newborns, interpretation of results should be based  on gestational age, weight and in agreement with clinical  observations.  Premature Infant recommended reference ranges:  Up to 24 hours.............<8.0 mg/dL  Up to 48 hours............<12.0 mg/dL  3-5 days..................<15.0 mg/dL  6-29 days.................<15.0 mg/dL             BUN, Bld   8           Calcium   7.8           Chloride   107           CO2   26           Cocaine (Metab.)               Color, UA         Yellow     Creatinine   0.5           Creatinine, Random Ur               Differential Method   Automated           eGFR if    >60           eGFR if non    >60  Comment:  Calculation used to obtain the estimated glomerular filtration  rate (eGFR) is the CKD-EPI equation.              Eos #   0.2           Eosinophil%   1.4           Glucose   76           Glucose, UA         Negative     Gran # (ANC)   7.0           Gran%   65.8           Group & Rh     O  POS         Hematocrit   32.5           Hemoglobin   9.7           HIV 1/2 Ag/Ab               INDIRECT ALEXANDRA     NEG         Coumadin Monitoring INR   1.0  Comment:  Coumadin Therapy:  2.0 - 3.0 for INR for all indicators except mechanical heart valves  and antiphospholipid syndromes which should use 2.5 - 3.5.             Ketones, UA         Negative     Lactate, Theron   1.4  Comment:  Falsely low lactic acid results can be found in samples   containing >=13.0 mg/dL total bilirubin and/or >=3.5 mg/dL   direct bilirubin.             Leukocytes, UA         Negative     Lipase               Lymph #   2.6           Lymph%   24.2           Magnesium   1.3           MCH   24.3           MCHC   29.8           MCV   82           Mono #   0.9           Mono%   8.5           MPV   8.9           NITRITE UA         Negative     Occult Blood               Occult Blood UA         Negative     Opiate Scrn, Ur               pH, UA         7.0     Phosphorus   2.9           Platelets   392           POCT Glucose 77             Potassium   3.9           PROTEIN TOTAL   6.9           Protein, UA         Negative  Comment:  Recommend a 24 hour urine protein or a urine   protein/creatinine ratio if globulin induced proteinuria is  clinically suspected.       Protime   10.8           RBC   3.99           RDW   17.4           Sodium   139           Specific [place], UA         1.010     Specimen UA         Urine, Clean Catch     Marijuana (THC) Metabolite               Toxicology Information               Troponin I               UROBILINOGEN UA         Negative     WBC   10.65                            09/23/19  2302        Benzodiazepines Negative     Methadone metabolites Negative     Phencyclidine Negative     Procalcitonin       Albumin 1.9     Alkaline Phosphatase 67     ALT 15     Amphetamine Screen, Ur Negative     Anion Gap 7     Appearance, UA       AST 12     Barbiturate Screen, Ur Negative     Baso # 0.06     Basophil% 0.5      Bilirubin (UA)       BILIRUBIN TOTAL 0.3  Comment:  For infants and newborns, interpretation of results should be based  on gestational age, weight and in agreement with clinical  observations.  Premature Infant recommended reference ranges:  Up to 24 hours.............<8.0 mg/dL  Up to 48 hours............<12.0 mg/dL  3-5 days..................<15.0 mg/dL  6-29 days.................<15.0 mg/dL       BUN, Bld 9     Calcium 7.6     Chloride 109     CO2 24     Cocaine (Metab.) Negative     Color, UA       Creatinine 0.5     Creatinine, Random Ur 10.8  Comment:  The random urine reference ranges provided were established   for 24 hour urine collections.  No reference ranges exist for  random urine specimens.  Correlate clinically.       Differential Method Automated     eGFR if  >60     eGFR if non  >60  Comment:  Calculation used to obtain the estimated glomerular filtration  rate (eGFR) is the CKD-EPI equation.        Eos # 0.3     Eosinophil% 2.3     Glucose 57     Glucose, UA       Gran # (ANC) 6.1     Gran% 53.7     Group & Rh       Hematocrit 28.5     Hemoglobin 8.5     HIV 1/2 Ag/Ab Negative     INDIRECT ALEXANDRA       Coumadin Monitoring INR 1.1  Comment:  Coumadin Therapy:  2.0 - 3.0 for INR for all indicators except mechanical heart valves  and antiphospholipid syndromes which should use 2.5 - 3.5.       Ketones, UA       Lactate, Theron 1.4  Comment:  Falsely low lactic acid results can be found in samples   containing >=13.0 mg/dL total bilirubin and/or >=3.5 mg/dL   direct bilirubin.       Leukocytes, UA       Lipase 52     Lymph # 3.6     Lymph% 31.9     Magnesium 1.3     MCH 24.6     MCHC 29.8     MCV 82     Mono # 1.3     Mono% 11.6     MPV 8.8     NITRITE UA       Occult Blood Positive  Comment:  Corrected result; previously reported as Negative on 09/24/2019 at   00:09.  [C]     Occult Blood UA       Opiate Scrn, Ur Negative     pH, UA       Phosphorus       Platelets 487      POCT Glucose       Potassium 2.8     PROTEIN TOTAL 6.4     Protein, UA       Protime 11.7     RBC 3.46     RDW 17.1     Sodium 140     Specific Gravity, UA       Specimen UA       Marijuana (THC) Metabolite Negative     Toxicology Information SEE COMMENT  Comment:  This screen includes the following classes of drugs at the   listed cut-off:  Benzodiazepines                  200 ng/ml  Methadone                        300 ng/ml  Cocaine metabolite               300 ng/ml  Opiates                          300 ng/ml  Barbiturates                     200 ng/ml  Amphetamines                    1000 ng/ml  Marijuana metabs (THC)            50 ng/ml  Phencyclidine (PCP)               25 ng/ml  High concentrations of Diphenhydramine may cross-react with  Phencyclidine PCP screening immunoassay giving a false   positive result.  High concentrations of Methylenedioxymethamphetamine (MDMA aka  Ectasy) and other structurally similar compounds may cross-   react with the Amphetamine/Methamphetamine screening   immunoassay giving a false positive result.  A metabolite of the anti-HIV drug Sustiva () may cause  false positive results in the Marijuana metabolite (THC)   screening assay.  Note: This exception list includes only more common   interferants in toxicology screen testing.  Because of many   cross-reactantspositive results on toxicology drug screens   should be confirmed whenever results do not correlate with   clinical presentation.  This report is intended for use in clinical monitoring and  management of patients. It is not intended for use in   employment related drug testing.  Because of any cross-reactants, positive results on toxicology  drug screens should be confirmed whenever results do not  correlate with clinical presentation.  Presumptive positive results are unconfirmed and may be used   only for medical purposes.  Assay Intended Use: This assay provides only a preliminary analytical  test result.  A more specific alternate chemical method must be used  to obtain a confirmed analytical result. Gas chromatography/mass  spectrometry (GS/MS)is the preferred confirmatory method. Clinical  consideration and professional judgement should be applied to any   drug of abuse test result, particularly when preliminary results  are used.       Troponin I 0.044  Comment:  The reference interval for Troponin I represents the 99th percentile   cutoff   for our facility and is consistent with 3rd generation assay   performance.       UROBILINOGEN UA       WBC 11.36           Significant Imaging:  Imaging results within the past 24 hours have been reviewed.

## 2019-09-24 NOTE — ASSESSMENT & PLAN NOTE
A recent EGD revealed a gastric ulcer with visible artery that was clipped. Depending on his clinical course will consider repeating an EGD before discharge.

## 2019-09-24 NOTE — ASSESSMENT & PLAN NOTE
Malnutrition in the context of Chronic Illness/Injury    Related to (etiology):  Physiological causes increasing nutrient needs due to illness     Signs and Symptoms (as evidenced by):  Energy Intake: <75% of estimated energy requirement for > 1 week  Body Fat Depletion: severe depletion of orbitals, triceps and thoracic and lumbar region   Muscle Mass Depletion: severe depletion of temples, clavicle region, scapular region and lower extremities     Interventions/Recommendations (treatment strategy):  See above     Nutrition Diagnosis Status:  New

## 2019-09-24 NOTE — CONSULTS
"   09/24/19 1400   Gastrointestinal   GI WDL ex   Stool Color brown, light;tan   GI Signs/Symptoms abdominal discomfort        Colostomy 07/01/19 1407 LLQ   Placement Date/Time: 07/01/19 1407   Present Prior to Hospital Arrival?: Yes  Location: LLQ   Wound Image      Stomal Appliance 1 piece;Changed;Per protocol/policy;No Leakage   Stoma Appearance rosebud appearance;moist;pink;protruding above skin level   Peristomal Assessment Denuded;Painful;Erythema   Accessories/Skin Care cleansed w/ water;barrier substance over peristomal skin;skin barrier powder;skin sealant   Stoma Function stool;tan;mushy   Treatment Bag change;Site care   Tolerance assisted with stoma care;signs/symptoms of discomfort     Consulted to this 59 year old male for colostomy complications. He is s/p colostomy creation 7/3/19 at [facility]. He reports he has not had any followup since his hospital discharge. Currently inpatient for GI bleed, visible blood in colostomy pouch.  He reports 10/10 pain to this stoma and peristomal area. Patient assisted with removing old pouch. Mild peristomal breakdown with no active bleeding noted. Stoma is pink and moist with rosebud appearance. Small spot on stoma at about 2 oclock suspicious for granuloma, he reports extreme pain to this site. Cleaned peristomal area with warm water, performed crusting method and applied hydrocolloid dressing to peristomal area. Applied Randall sensura pouch. Notified attending physician that patient may benefit from general surgery consult.    Please review [facility]'s procedure "Pouching : Colostomy or Ileostomy" for instructions on ostomy.stoma care. Change ostomy appliance weekly or IMMEDIATELY IF LEAKING. All ostomy care supplies can be requested from [employer] management.    OSTOMY CARE SUPPLIES:  One Piece Flat Randall Pouch #[id number]   Brava Stoma Ring #[id number]   Cavilon Spray #[id number]    Ostomy Powder #[id number]           " Never smoker

## 2019-09-24 NOTE — CONSULTS
"  [facility]  Adult Nutrition  Consult Note    SUMMARY     Recommendations    Recommendation: 1. Consider ST eval. 2. When medically able, ADAT to GI soft/ Low residue. 3. Add boost breeze TID. 4. If unable to adv diet to at least full liquids within the next 72-96 hrs consider alternate nutrition support. 4. RD to f/u.   Goals: Meet > 85 % EEN/EPN while admitted  Nutrition Goal Status: new  Communication of RD Recs: (POC, sticky note)    Reason for Assessment    Reason For Assessment: consult   Dx: Gastrointestinal hemorrhage, Abdominal pain  Hx: COPD, GERD, hx of necrotic bowel, Peptic ulcer   General info comments: Pt admitted w/ c/o abdominal pain around his colostomy. Currently on clear liquid diet. Pt reports painful swallowing. Pt reports UBW = 130 lbs, current wt= 130 lbs (no recent wt loss).  Per [facility] 9/24/19, severe muscle wasting/ fat depletion noted.   discharge plan: GI soft/ Low residue diet     Nutrition Risk Screen    Nutrition Risk Screen: dysphagia or difficulty swallowing    Nutrition/Diet History    Spiritual, Cultural Beliefs, Jainism Practices, Values that Affect Care: yes    Anthropometrics    Temp: 98.1 °F (36.7 °C)  Height Method: Stated  Height: 5' 6" (167.6 cm)  Height (inches): 66 in  Weight Method: Bed Scale  Weight: 59 kg (130 lb 1.1 oz)  Weight (lb): 130.07 lb  Ideal Body Weight (IBW), Male: 142 lb  % Ideal Body Weight, Male (lb): 91.6 lb  BMI (Calculated): 21  BMI Grade: 18.5-24.9 - normal       Lab/Procedures/Meds    Pertinent Labs Reviewed: reviewed  BMP  Lab Results   Component Value Date     09/24/2019    K 3.9 09/24/2019     09/24/2019    CO2 26 09/24/2019    BUN 8 09/24/2019    CREATININE 0.5 09/24/2019    CALCIUM 7.8 (L) 09/24/2019    ANIONGAP 6 (L) 09/24/2019    ESTGFRAFRICA >60 09/24/2019    EGFRNONAA >60 09/24/2019     Lab Results   Component Value Date    CALCIUM 7.8 (L) 09/24/2019    PHOS 2.9 09/24/2019     Lab Results   Component Value Date "    ALBUMIN 2.1 (L) 09/24/2019     Recent Labs   Lab 09/24/19  0326   POCTGLUCOSE 77       Pertinent Medications Reviewed: reviewed      Estimated/Assessed Needs    Weight Used For Calorie Calculations: 59 kg (130 lb 1.1 oz)  Energy Calorie Requirements (kcal): 1764  Energy Need Method: [place]-St Jeor(x1.3)  Protein Requirements: 70 g  Weight Used For Protein Calculations: 59 kg (130 lb 1.1 oz)     Estimated Fluid Requirement Method: RDA Method(or per MD)  RDA Method (mL): 1764         Nutrition Prescription Ordered    Nutrition Order Comments: clear liquid diet    Evaluation of Received Nutrient/Fluid Intake       % Intake of Estimated Energy Needs: 25 - 50 %  % Meal Intake: 50 - 75 %    Nutrition Risk      2xweekly    Assessment and Plan    Moderate malnutrition  Malnutrition in the context of Chronic Illness/Injury    Related to (etiology):  Physiological causes increasing nutrient needs due to illness     Signs and Symptoms (as evidenced by):  Energy Intake: <75% of estimated energy requirement for > 1 week  Body Fat Depletion: severe depletion of orbitals, triceps and thoracic and lumbar region   Muscle Mass Depletion: severe depletion of temples, clavicle region, scapular region and lower extremities     Interventions/Recommendations (treatment strategy):  See above     Nutrition Diagnosis Status:  New       Monitor and Evaluation    Food and Nutrient Intake: energy intake, food and beverage intake  Food and Nutrient Adminstration: diet order  Anthropometric Measurements: weight  Biochemical Data, Medical Tests and Procedures: electrolyte and renal panel, glucose/endocrine profile  Nutrition-Focused Physical Findings: overall appearance     Malnutrition Assessment                 Orbital Region (Subcutaneous Fat Loss): severe depletion  Upper Arm Region (Subcutaneous Fat Loss): severe depletion  Thoracic and Lumbar Region: severe depletion   Orthodoxy Region (Muscle Loss): severe depletion  Clavicle Bone Region  (Muscle Loss): severe depletion  Clavicle and Acromion Bone Region (Muscle Loss): severe depletion  Scapular Bone Region (Muscle Loss): severe depletion  Patellar Region (Muscle Loss): severe depletion                 Nutrition Follow-Up    RD Follow-up?: Yes

## 2019-09-24 NOTE — ASSESSMENT & PLAN NOTE
- Initial H&H 8.5/28.5, asymptomatic and hemodynamically stable.  Repeat H&H Q8 hours and transfuse as needed.  - IV Protonix 40 mg BID.  - IV hydration.  - Keep NPO.  - GI consult in AM.

## 2019-09-24 NOTE — ASSESSMENT & PLAN NOTE
MELD-Na score: 6 at 9/24/2019  3:24 AM  MELD score: 6 at 9/24/2019  3:24 AM  Calculated from:  Serum Creatinine: 0.5 mg/dL (Rounded to 1 mg/dL) at 9/24/2019  3:24 AM  Serum Sodium: 139 mmol/L (Rounded to 137 mmol/L) at 9/24/2019  3:24 AM  Total Bilirubin: 0.3 mg/dL (Rounded to 1 mg/dL) at 9/24/2019  3:24 AM  INR(ratio): 1.0 at 9/24/2019  3:24 AM  Age: 59 years    Patient has mild ascites on CT scan but it could be fluid from his prior surgical intervention. No evidence or stigmata of chronic liver disease and no evident portal hypertension. CT Scan was done without contrast so it is a poor study to evaluate liver and GI anatomy.     Component Name Value Ref Range   HCV RNA Quantification Interpretation Detected (A) Undetected    HCV RNA Quantification 317 IU/mL   HCV RNA Quantification Log Value 2.50     Specimen Collected on   Blood - Vein 7/31/2019 9:06 PM   Result Narrative     Patient will need outpatient therapy, referral to [facility].

## 2019-09-24 NOTE — HPI
Mr. Garcia is a 59 y.o. male with a PMHx of COPD, HLD, PUD, Hep C, chronic pain syndrome, tobacco use and recent h/o ischemic colitis s/p sigmoid colectomy with end colostomy creation 7/3/19 at [facility].  He presented to the ED with complaints of abdominal pain around his colostomy that has progressively worsened over the past few days.  Associated dark red blood noted in his colostomy bag today, and diaphoresis.  No aggravating or alleviating factors.  Denies any abdominal distention, decreased appetite, nausea, vomiting, diarrhea, constipation, hematemesis, dysuria, hematuria, back pain, chest pain, shortness of breath, lightheadedness or dizziness, syncope, weakness, fever or chills.  Initial workup in the ED resulted H&H of 8.5/28.5, platelets 487, BUN 9, creatinine 0.5, potassium 2.8, albumin 1.9, glucose 57, lactic 1.4, Hemoccult positive, unremarkable UA and CXR.  CT of the abdomen/pelvis showed ostomy in the mid abdomen with no evidence of bowel obstruction or definite acute bowel abnormalities, there may be a gallstone present with mild gallbladder wall thickening, enlarged liver measuring 22 cm in length with a couple small hepatic cysts, and mild ascites identified around the liver and extending into the pelvis.  Patient noted to be mildly tachycardic in the ED with heart rate max of 111 bpm, remained hemodynamically stable.  He received IV fluid resuscitation, IV Reglan and morphine in the ED.  Hospital Medicine was called for admission.

## 2019-09-24 NOTE — HPI
Mr. Garcia is a 59 y.o. male who recently was diagnosed ischemic colitis s/p sigmoid colectomy with end colostomy creation 7/3/[facility].  He presents to the ED with complaints of abdominal pain around his colostomy that has progressively worsened over the past few days and bleeding around the stoma.  Associated diaphoresis.  He has a history of gastric surgery to patch a perforated ulcer. CT of the abdomen/pelvis without contrast showed ostomy in the mid abdomen with no evidence of bowel obstruction or definite acute bowel abnormalities, an enlarged liver measuring 22 cm in length with a couple small hepatic cysts, and mild ascites identified around the liver and extending into the pelvis. He has a history of drug abuse. He is thin and has no stigmata of chronic liver disease.   I am not certain that he is bleeding internally but more likely around the stoma.

## 2019-09-24 NOTE — ASSESSMENT & PLAN NOTE
- Patient now with acute GI bleed.  See plan above.  - Wound care consult for ostomy management.  - GI consulted.

## 2019-09-24 NOTE — ASSESSMENT & PLAN NOTE
- Patient now with acute GI bleed.  See plan above.  - CT of the abdomen/pelvis was negative for acute bowel abnormalities.  - Wound care consult for ostomy management.  - GI consulted.

## 2019-09-24 NOTE — ASSESSMENT & PLAN NOTE
I am not certain that bleeding is coming from GI source but more likely from the stoma site. Wound care nurse consulted.   During his July admit to [facility] the following summary was retrieved:  Mr. Garcia is a 59-year-old male with a past medical history significant for hypertension, hyperlipidemia, chronic hepatitis C, COPD, peptic ulcer disease with perforation, AAA status post repair, SALENA/depression, polysubstance abuse who was admitted on 6/17 and ultimately found to have ischemic colitis requiring bowel resection and creation of an ostomy. This was complicated by development of a left retroperitoneal abscess. Patient underwent CT-guided catheter placement of drain and still has 2 PHOEBE drains in place with what appears to be serosanguineous output. Of note his hospital stay has also been complicated by development of gastric ulcer which underwent Endo Clip placement on 7/10. In regards to his wound cultures from his left retroperitoneal abscess he is grown group B strep as well as multiple organisms from the anaerobic cultures suggesting polymicrobial infection. Patient has been on a number of courses of antibiotics since 6/21 including multiple treatment courses of ciprofloxacin and metronidazole. He has been transitioned to amoxicillin clavulanic acid as he is still febrile with a temperature of 100.6 over the last 24 hours. On examination patient has increased tenderness at the site of his incision but otherwise abdominal exam is unremarkable. In regards to his labs his white count is decreasing but he continues to have febrile episodes. I am concerned given his elevated temperature even on appropriate antibiotic therapy that there may not be source control in regards to drainage of his abscess and there may be some utility and reimaging his abdomen.      We will determine when and if he will need endoscopic work up depending on the clinical course and response to Rx.    Patient did not have a CTA on admit.      GI Pathway is in place.

## 2019-09-24 NOTE — ED PROVIDER NOTES
SCRIBE #1 NOTE: I, Leeann Santana, am scribing for, and in the presence of, Breonna Rios MD. I have scribed the entire note.      History      Chief Complaint   Patient presents with    Abdominal Pain     blood in colostomy. Emptied 4 times today.       Review of patient's allergies indicates:   Allergen Reactions    Asa [aspirin]     Nsaids (non-steroidal anti-inflammatory drug)     Tramadol         HPI   HPI    9/23/2019, 9:47 PM   History obtained from the patient      History of Present Illness: Arsalan Garcia Jr. is a 59 y.o. male patient with PSHx of resection of large intestine with colostomy bag on 7/3 who presents to the Emergency Department for abdominal pain around his colostomy which onset gradually a few days ago. He noticed blood in his colostomy bag today. Symptoms are constant and moderate in severity.  No mitigating or exacerbating factors reported. Associated sxs include diaphoresis and hematochezia. Patient denies any fever, chills, nausea, emesis, dysuria, hematuria, urgency/frequency, flank pain, testicular pain, penile swelling, and all other sxs at this time. No further complaints or concerns at this time.       Arrival mode: Ambulance services    PCP: Provider [facility]       Past Medical History:  Past Medical History:   Diagnosis Date    COPD (chronic obstructive pulmonary disease)     GERD (gastroesophageal reflux disease)     History of necrotic bowel 07/2019    Hypercholesteremia     Peptic ulcer     Shoulder pain        Past Surgical History:  Past Surgical History:   Procedure Laterality Date    BACK SURGERY      COLECTOMY  07/2019    COLON SURGERY      COLOSTOMY  07/2019    LUNG BIOPSY      NECK SURGERY      peptic ulcer surgery           Family History:  History reviewed. No pertinent family history.    Social History:  Social History     Tobacco Use    Smoking status: Current Every Day Smoker     Packs/day: 1.00     Types: Cigarettes    Smokeless  tobacco: Never Used   Substance and Sexual Activity    Alcohol use: No    Drug use: No    Sexual activity: Unknown       ROS   Review of Systems   Constitutional: Negative for chills and fever.   HENT: Negative for sore throat.    Respiratory: Negative for shortness of breath.    Cardiovascular: Negative for chest pain.   Gastrointestinal: Positive for abdominal pain (around the colostomy) and blood in stool (in colostomy bag). Negative for nausea and vomiting.   Genitourinary: Negative for dysuria, flank pain, frequency, penile swelling, testicular pain and urgency.   Musculoskeletal: Negative for back pain.   Skin: Negative for rash.   Neurological: Negative for weakness.   Hematological: Does not bruise/bleed easily.   All other systems reviewed and are negative.      Physical Exam      Initial Vitals [09/23/19 2140]   BP Pulse Resp Temp SpO2   (!) 154/103 (!) 111 (!) 24 99 °F (37.2 °C) 98 %      MAP       --          Physical Exam  Nursing Notes and Vital Signs Reviewed.  Constitutional: Patient is in no acute distress. Well-developed and well-nourished.  Head: Atraumatic. Normocephalic.  Eyes: PERRL. EOM intact. Conjunctivae are not pale. No scleral icterus.  ENT: Mucous membranes are moist. Oropharynx is clear and symmetric.    Neck: Supple. Full ROM. No lymphadenopathy.  Cardiovascular: Tachycardic. Regular rhythm. No murmurs, rubs, or gallops. Distal pulses are 2+ and symmetric.  Pulmonary/Chest: No respiratory distress. Clear to auscultation bilaterally. No wheezing or rales.  Abdominal: Soft and non-distended. Colostomy to LLQ with brown stool. There is diffused abd tenderness.  No rebound, guarding, or rigidity.  Genitourinary: No CVA tenderness  Musculoskeletal: Moves all extremities. No obvious deformities. No edema. No calf tenderness.  Skin: Diaphoretic.   Neurological:  Alert, awake, and appropriate.  Normal speech.  No acute focal neurological deficits are appreciated.  Psychiatric: Normal  affect. Good eye contact. Appropriate in content.    ED Course    Procedures  ED Vital Signs:  Vitals:    09/24/19 2105 09/24/19 2205 09/24/19 2305 09/25/19 0005   BP:  115/85  115/67   Pulse: 100 102 100 98   Resp: 16 14 14 14   Temp:   98.6 °F (37 °C)    TempSrc:   Oral    SpO2:   96%    Weight:       Height:        09/25/19 0105 09/25/19 0215 09/25/19 0305 09/25/19 0405   BP:  121/77  127/80   Pulse: 98 101 99 89   Resp: 14 13 11 15   Temp:   98.9 °F (37.2 °C)    TempSrc:   Oral    SpO2:   95%    Weight:       Height:        09/25/19 0505 09/25/19 0605 09/25/19 0612 09/25/19 0730   BP:  (!) 131/97  (!) 131/97   Pulse: 86 89     Resp: 11 12     Temp:    98.4 °F (36.9 °C)   TempSrc:       SpO2:    97%   Weight:   43.6 kg (96 lb 1.9 oz)    Height:        09/25/19 0828 09/25/19 0834 09/25/19 1127   BP:      Pulse: 99 95    Resp: 11 11    Temp:   97.8 °F (36.6 °C)   TempSrc:   Oral   SpO2: 97% 97%    Weight:      Height:          Abnormal Lab Results:  Labs Reviewed   CBC W/ AUTO DIFFERENTIAL - Abnormal; Notable for the following components:       Result Value    RBC 3.46 (*)     Hemoglobin 8.5 (*)     Hematocrit 28.5 (*)     Mean Corpuscular Hemoglobin 24.6 (*)     Mean Corpuscular Hemoglobin Conc 29.8 (*)     RDW 17.1 (*)     Platelets 487 (*)     MPV 8.8 (*)     Mono # 1.3 (*)     All other components within normal limits   COMPREHENSIVE METABOLIC PANEL - Abnormal; Notable for the following components:    Potassium 2.8 (*)     Glucose 57 (*)     Calcium 7.6 (*)     Albumin 1.9 (*)     Anion Gap 7 (*)     All other components within normal limits   DRUG SCREEN PANEL, URINE EMERGENCY - Abnormal; Notable for the following components:    Creatinine, Random Ur 10.8 (*)     All other components within normal limits    Narrative:     Preferred Collection Type->Urine, Clean Catch   OCCULT BLOOD X 1, STOOL - Abnormal; Notable for the following components:    Occult Blood Positive (*)     All other components within normal  limits   CBC W/ AUTO DIFFERENTIAL - Abnormal; Notable for the following components:    RBC 3.99 (*)     Hemoglobin 9.7 (*)     Hematocrit 32.5 (*)     Mean Corpuscular Hemoglobin 24.3 (*)     Mean Corpuscular Hemoglobin Conc 29.8 (*)     RDW 17.4 (*)     Platelets 392 (*)     MPV 8.9 (*)     All other components within normal limits   COMPREHENSIVE METABOLIC PANEL - Abnormal; Notable for the following components:    Calcium 7.8 (*)     Albumin 2.1 (*)     Anion Gap 6 (*)     All other components within normal limits   MAGNESIUM - Abnormal; Notable for the following components:    Magnesium 1.3 (*)     All other components within normal limits   MAGNESIUM - Abnormal; Notable for the following components:    Magnesium 1.3 (*)     All other components within normal limits   TROPONIN I - Abnormal; Notable for the following components:    Troponin I 0.044 (*)     All other components within normal limits   HIV 1 / 2 ANTIBODY   LACTIC ACID, PLASMA   URINALYSIS, REFLEX TO URINE CULTURE    Narrative:     Preferred Collection Type->Urine, Clean Catch   PROCALCITONIN   LIPASE   PROTIME-INR   LACTIC ACID, PLASMA   PROTIME-INR   PHOSPHORUS   MAGNESIUM   TYPE & SCREEN   POCT GLUCOSE        All Lab Results:  Results for orders placed or performed during the hospital encounter of 09/23/19   Blood culture x two cultures. Draw prior to antibiotics.   Result Value Ref Range    Blood Culture, Routine No Growth to date     Blood Culture, Routine No Growth to date     Blood Culture, Routine No Growth to date    Blood culture x two cultures. Draw prior to antibiotics.   Result Value Ref Range    Blood Culture, Routine No Growth to date     Blood Culture, Routine No Growth to date     Blood Culture, Routine No Growth to date    HIV 1/2 Ag/Ab (4th Gen)   Result Value Ref Range    HIV 1/2 Ag/Ab Negative Negative   CBC auto differential   Result Value Ref Range    WBC 11.36 3.90 - 12.70 K/uL    RBC 3.46 (L) 4.60 - 6.20 M/uL    Hemoglobin 8.5  (L) 14.0 - 18.0 g/dL    Hematocrit 28.5 (L) 40.0 - 54.0 %    Mean Corpuscular Volume 82 82 - 98 fL    Mean Corpuscular Hemoglobin 24.6 (L) 27.0 - 31.0 pg    Mean Corpuscular Hemoglobin Conc 29.8 (L) 32.0 - 36.0 g/dL    RDW 17.1 (H) 11.5 - 14.5 %    Platelets 487 (H) 150 - 350 K/uL    MPV 8.8 (L) 9.2 - 12.9 fL    Gran # (ANC) 6.1 1.8 - 7.7 K/uL    Lymph # 3.6 1.0 - 4.8 K/uL    Mono # 1.3 (H) 0.3 - 1.0 K/uL    Eos # 0.3 0.0 - 0.5 K/uL    Baso # 0.06 0.00 - 0.20 K/uL    Gran% 53.7 38.0 - 73.0 %    Lymph% 31.9 18.0 - 48.0 %    Mono% 11.6 4.0 - 15.0 %    Eosinophil% 2.3 0.0 - 8.0 %    Basophil% 0.5 0.0 - 1.9 %    Differential Method Automated    Comprehensive metabolic panel   Result Value Ref Range    Sodium 140 136 - 145 mmol/L    Potassium 2.8 (L) 3.5 - 5.1 mmol/L    Chloride 109 95 - 110 mmol/L    CO2 24 23 - 29 mmol/L    Glucose 57 (L) 70 - 110 mg/dL    BUN, Bld 9 6 - 20 mg/dL    Creatinine 0.5 0.5 - 1.4 mg/dL    Calcium 7.6 (L) 8.7 - 10.5 mg/dL    Total Protein 6.4 6.0 - 8.4 g/dL    Albumin 1.9 (L) 3.5 - 5.2 g/dL    Total Bilirubin 0.3 0.1 - 1.0 mg/dL    Alkaline Phosphatase 67 55 - 135 U/L    AST 12 10 - 40 U/L    ALT 15 10 - 44 U/L    Anion Gap 7 (L) 8 - 16 mmol/L    eGFR if African American >60 >60 mL/min/1.73 m^2    eGFR if non African American >60 >60 mL/min/1.73 m^2   Lactic acid, plasma #1   Result Value Ref Range    Lactate (Lactic Acid) 1.4 0.5 - 2.2 mmol/L   Urinalysis, Reflex to Urine Culture Urine, Clean Catch   Result Value Ref Range    Specimen UA Urine, Clean Catch     Color, UA Yellow Yellow, Straw, Rebekah    Appearance, UA Clear Clear    pH, UA 7.0 5.0 - 8.0    Specific Gravity, UA 1.010 1.005 - 1.030    Protein, UA Negative Negative    Glucose, UA Negative Negative    Ketones, UA Negative Negative    Bilirubin (UA) Negative Negative    Occult Blood UA Negative Negative    Nitrite, UA Negative Negative    Urobilinogen, UA Negative <2.0 EU/dL    Leukocytes, UA Negative Negative   Procalcitonin    Result Value Ref Range    Procalcitonin 0.06 <0.25 ng/mL   Lipase   Result Value Ref Range    Lipase 52 4 - 60 U/L   Protime-INR   Result Value Ref Range    Prothrombin Time 11.7 9.0 - 12.5 sec    INR 1.1 0.8 - 1.2   Drug screen panel, emergency   Result Value Ref Range    Benzodiazepines Negative     Methadone metabolites Negative     Cocaine (Metab.) Negative     Opiate Scrn, Ur Negative     Barbiturate Screen, Ur Negative     Amphetamine Screen, Ur Negative     THC Negative     Phencyclidine Negative     Creatinine, Random Ur 10.8 (L) 23.0 - 375.0 mg/dL    Toxicology Information SEE COMMENT    Occult blood x 1, stool   Result Value Ref Range    Occult Blood Positive (A) Negative   Lactic acid, plasma #2   Result Value Ref Range    Lactate (Lactic Acid) 1.4 0.5 - 2.2 mmol/L   CBC auto differential   Result Value Ref Range    WBC 10.65 3.90 - 12.70 K/uL    RBC 3.99 (L) 4.60 - 6.20 M/uL    Hemoglobin 9.7 (L) 14.0 - 18.0 g/dL    Hematocrit 32.5 (L) 40.0 - 54.0 %    Mean Corpuscular Volume 82 82 - 98 fL    Mean Corpuscular Hemoglobin 24.3 (L) 27.0 - 31.0 pg    Mean Corpuscular Hemoglobin Conc 29.8 (L) 32.0 - 36.0 g/dL    RDW 17.4 (H) 11.5 - 14.5 %    Platelets 392 (H) 150 - 350 K/uL    MPV 8.9 (L) 9.2 - 12.9 fL    Gran # (ANC) 7.0 1.8 - 7.7 K/uL    Lymph # 2.6 1.0 - 4.8 K/uL    Mono # 0.9 0.3 - 1.0 K/uL    Eos # 0.2 0.0 - 0.5 K/uL    Baso # 0.05 0.00 - 0.20 K/uL    Gran% 65.8 38.0 - 73.0 %    Lymph% 24.2 18.0 - 48.0 %    Mono% 8.5 4.0 - 15.0 %    Eosinophil% 1.4 0.0 - 8.0 %    Basophil% 0.5 0.0 - 1.9 %    Differential Method Automated    Comprehensive metabolic panel   Result Value Ref Range    Sodium 139 136 - 145 mmol/L    Potassium 3.9 3.5 - 5.1 mmol/L    Chloride 107 95 - 110 mmol/L    CO2 26 23 - 29 mmol/L    Glucose 76 70 - 110 mg/dL    BUN, Bld 8 6 - 20 mg/dL    Creatinine 0.5 0.5 - 1.4 mg/dL    Calcium 7.8 (L) 8.7 - 10.5 mg/dL    Total Protein 6.9 6.0 - 8.4 g/dL    Albumin 2.1 (L) 3.5 - 5.2 g/dL    Total  Bilirubin 0.3 0.1 - 1.0 mg/dL    Alkaline Phosphatase 66 55 - 135 U/L    AST 18 10 - 40 U/L    ALT 19 10 - 44 U/L    Anion Gap 6 (L) 8 - 16 mmol/L    eGFR if African American >60 >60 mL/min/1.73 m^2    eGFR if non African American >60 >60 mL/min/1.73 m^2   Protime-INR   Result Value Ref Range    Prothrombin Time 10.8 9.0 - 12.5 sec    INR 1.0 0.8 - 1.2   Magnesium   Result Value Ref Range    Magnesium 1.3 (L) 1.6 - 2.6 mg/dL   Phosphorus   Result Value Ref Range    Phosphorus 2.9 2.7 - 4.5 mg/dL   Magnesium   Result Value Ref Range    Magnesium 1.3 (L) 1.6 - 2.6 mg/dL   Troponin I   Result Value Ref Range    Troponin I 0.044 (H) 0.000 - 0.026 ng/mL   CBC auto differential   Result Value Ref Range    WBC 11.50 3.90 - 12.70 K/uL    RBC 4.22 (L) 4.60 - 6.20 M/uL    Hemoglobin 10.2 (L) 14.0 - 18.0 g/dL    Hematocrit 34.8 (L) 40.0 - 54.0 %    Mean Corpuscular Volume 83 82 - 98 fL    Mean Corpuscular Hemoglobin 24.2 (L) 27.0 - 31.0 pg    Mean Corpuscular Hemoglobin Conc 29.3 (L) 32.0 - 36.0 g/dL    RDW 17.6 (H) 11.5 - 14.5 %    Platelets 378 (H) 150 - 350 K/uL    MPV 8.9 (L) 9.2 - 12.9 fL    Gran # (ANC) 7.4 1.8 - 7.7 K/uL    Lymph # 2.9 1.0 - 4.8 K/uL    Mono # 1.0 0.3 - 1.0 K/uL    Eos # 0.2 0.0 - 0.5 K/uL    Baso # 0.07 0.00 - 0.20 K/uL    Gran% 64.6 38.0 - 73.0 %    Lymph% 25.0 18.0 - 48.0 %    Mono% 8.3 4.0 - 15.0 %    Eosinophil% 1.8 0.0 - 8.0 %    Basophil% 0.6 0.0 - 1.9 %    Differential Method Automated    CBC auto differential   Result Value Ref Range    WBC 11.38 3.90 - 12.70 K/uL    RBC 4.12 (L) 4.60 - 6.20 M/uL    Hemoglobin 9.7 (L) 14.0 - 18.0 g/dL    Hematocrit 33.4 (L) 40.0 - 54.0 %    Mean Corpuscular Volume 81 (L) 82 - 98 fL    Mean Corpuscular Hemoglobin 23.5 (L) 27.0 - 31.0 pg    Mean Corpuscular Hemoglobin Conc 29.0 (L) 32.0 - 36.0 g/dL    RDW 17.4 (H) 11.5 - 14.5 %    Platelets 417 (H) 150 - 350 K/uL    MPV 9.0 (L) 9.2 - 12.9 fL    Gran # (ANC) 7.5 1.8 - 7.7 K/uL    Lymph # 2.5 1.0 - 4.8 K/uL    Mono  # 1.0 0.3 - 1.0 K/uL    Eos # 0.3 0.0 - 0.5 K/uL    Baso # 0.06 0.00 - 0.20 K/uL    Gran% 66.1 38.0 - 73.0 %    Lymph% 22.3 18.0 - 48.0 %    Mono% 9.0 4.0 - 15.0 %    Eosinophil% 2.5 0.0 - 8.0 %    Basophil% 0.5 0.0 - 1.9 %    Differential Method Automated    Comprehensive metabolic panel   Result Value Ref Range    Sodium 131 (L) 136 - 145 mmol/L    Potassium 4.3 3.5 - 5.1 mmol/L    Chloride 97 95 - 110 mmol/L    CO2 26 23 - 29 mmol/L    Glucose 87 70 - 110 mg/dL    BUN, Bld 9 6 - 20 mg/dL    Creatinine 0.6 0.5 - 1.4 mg/dL    Calcium 8.5 (L) 8.7 - 10.5 mg/dL    Total Protein 6.9 6.0 - 8.4 g/dL    Albumin 2.0 (L) 3.5 - 5.2 g/dL    Total Bilirubin 0.3 0.1 - 1.0 mg/dL    Alkaline Phosphatase 69 55 - 135 U/L    AST 15 10 - 40 U/L    ALT 15 10 - 44 U/L    Anion Gap 8 8 - 16 mmol/L    eGFR if African American >60 >60 mL/min/1.73 m^2    eGFR if non African American >60 >60 mL/min/1.73 m^2   Type & Screen   Result Value Ref Range    Group & Rh O POS     Indirect Paul NEG    POCT glucose   Result Value Ref Range    POCT Glucose 77 70 - 110 mg/dL   POCT glucose   Result Value Ref Range    POCT Glucose 77 70 - 110 mg/dL   POCT glucose   Result Value Ref Range    POCT Glucose 69 (L) 70 - 110 mg/dL   POCT glucose   Result Value Ref Range    POCT Glucose 97 70 - 110 mg/dL   POCT glucose   Result Value Ref Range    POCT Glucose 76 70 - 110 mg/dL         Imaging Results:  Imaging Results          X-Ray Chest AP Portable (Final result)  Result time 09/23/19 23:36:35    Final result by Glenn Gomez MD (Timothy) (09/23/19 23:36:35)                 Impression:      Mild interstitial scarring noted bilaterally.  No definite acute infiltrates.      Electronically signed by: Glenn Gomez MD  Date:    09/23/2019  Time:    23:36             Narrative:    EXAMINATION:  XR CHEST AP PORTABLE    CLINICAL HISTORY:  <Diagnosis>, Sepsis;    COMPARISON:  None    FINDINGS:  Heart size is normal.  There is mild interstitial scarring seen  involving the lungs.  Surgical clips are seen in the mid left lung.  No acute infiltrates.    Previous cervical spine surgery                               CT Abdomen Pelvis  Without Contrast (Final result)  Result time 09/23/19 23:23:40    Final result by Glenn Gomez MD (Timothy) (09/23/19 23:23:40)                 Impression:      There is ostomy identified in the mid abdomen.  No evidence of bowel obstruction.  No definite acute bowel abnormalities.    There may be a gallstone present.  Gallbladder wall may be mildly thickened.  Consider gallbladder ultrasound.    The liver is enlarged measuring 22 cm in length.  There are couple small hepatic cysts.    There is mild ascites identified around the liver and extending into the pelvis.    There are chronic changes at the lung bases.    All CT scans at this facility use dose modulation, iterative reconstructions, and/or weight base dosing when appropriate to reduce radiation dose to as low as reasonably achievable      Electronically signed by: Glenn Gomez MD  Date:    09/23/2019  Time:    23:23             Narrative:    EXAMINATION:  CT ABDOMEN PELVIS WITHOUT CONTRAST    CLINICAL HISTORY:  Abdominal pain, unspecified;    COMPARISON:  None    FINDINGS:  No lung infiltrates.  There are bullous changes in blebs noted at the lung bases.    Liver appears enlarged.  There are couple of small hepatic cysts.  No definite liver masses.  No focal splenic masses.    No pancreatic masses.    There may be a small gallstone.  Gallbladder wall may be mildly thickened.  No definite bile duct dilatation.    The kidneys are normal. No obstructive uropathy.    There is atherosclerosis of the abdominal aorta.  There appears to be aortic bypass graft present.    Patient has a ostomy identified in the mid abdomen.  No evidence of bowel obstruction.  There is mild ascites around the liver and in the pelvis.    Bladder is normal.    Skeletal structures are intact.                                The EKG was ordered, reviewed, and independently interpreted by the ED provider.  Interpretation time: 2258  Rate: 106 BPM  Rhythm: sinus tachycardia with premature atrial contractions (PAC)  Interpretation: Minimal voltage criteria for LVH, may be normal variatn. No STEMI.           The Emergency Provider reviewed the vital signs and test results, which are outlined above.    ED Discussion     12:38 AM: Discussed case with Dimple Bustillos NP ([facility]). Dr. Valadez agrees with current care and management of pt and accepts admission.   Admitting Service: Hospital medicine   Admitting Physician: Dr. Valadez  Admit to: In-patient Tele    12:40 AM: Re-evaluated pt. I have discussed test results, shared treatment plan, and the need for admission with patient and family at bedside. Pt and family express understanding at this time and agree with all information. All questions answered. Pt and family have no further questions or concerns at this time. Pt is ready for admit.      ED Medication(s):  Medications   potassium chloride 10% oral solution 40 mEq (40 mEq Oral Not Given 9/24/19 0600)   sodium chloride 0.9% bolus 1,770 mL (0 mL/kg × 59 kg Intravenous Stopped 9/24/19 0206)   metoclopramide HCl injection 10 mg (10 mg Intravenous Given 9/23/19 2350)   potassium chloride 10% oral solution 20 mEq (20 mEq Oral Given 9/24/19 0055)   morphine injection 4 mg (4 mg Intravenous Given 9/24/19 0055)   pneumoc 13-waqar conj-dip cr(PF) (PREVNAR 13 (PF)) 0.5 mL (0.5 mLs Intramuscular Given 9/24/19 0526)   magnesium sulfate 2g in water 50mL IVPB (premix) (2 g Intravenous New Bag 9/24/19 0940)   furosemide injection 20 mg (20 mg Intravenous Given 9/25/19 0907)       Discharge Medication List as of 9/25/2019 12:21 PM      START taking these medications    Details   oxyCODONE (ROXICODONE) 10 mg Tab immediate release tablet Take 1 tablet (10 mg total) by mouth every 24 hours as needed., Starting Wed 9/25/2019, Until Sat  9/28/2019, Print      spironolactone (ALDACTONE) 50 MG tablet Take 1 tablet (50 mg total) by mouth once daily. For fluid retention, Starting Thu 9/26/2019, Until Sat 10/26/2019, Normal             Follow-up Information     Provider [facility].    Why:  Advise pt to see Ostomy  Nurse at Ostomy  clinic at [facility]            Please follow up.    Why:  Advise pt to follow up with colon surgeon for further care at [facility]                    Medical Decision Making    Medical Decision Making:   Clinical Tests:   Lab Tests: Ordered and Reviewed  Radiological Study: Ordered and Reviewed  Medical Tests: Ordered and Reviewed           Scribe Attestation:   Scribe #1: I performed the above scribed service and the documentation accurately describes the services I performed. I attest to the accuracy of the note.    Attending:   Physician Attestation Statement for Scribe #1: I, Breonna Rios MD, personally performed the services described in this documentation, as scribed by Leeann Santana, in my presence, and it is both accurate and complete.          Clinical Impression       ICD-10-CM ICD-9-CM   1. Gastrointestinal hemorrhage, unspecified gastrointestinal hemorrhage type K92.2 578.9   2. Abdominal pain R10.9 789.00   3. GI bleed K92.2 578.9   4. History of ischemic colitis s/p sigmoid colectomy with end colostomy creation 7/3/19 K55.9 557.9   5. Chronic hepatitis C without hepatic coma B18.2 070.54       Disposition:   Disposition: Admitted  Condition: Fair         Breonna Rios MD  09/26/19 1016

## 2019-09-25 VITALS
HEART RATE: 95 BPM | RESPIRATION RATE: 11 BRPM | WEIGHT: 96.13 LBS | BODY MASS INDEX: 15.45 KG/M2 | TEMPERATURE: 98 F | SYSTOLIC BLOOD PRESSURE: 131 MMHG | DIASTOLIC BLOOD PRESSURE: 97 MMHG | HEIGHT: 66 IN | OXYGEN SATURATION: 97 %

## 2019-09-25 PROBLEM — E87.6 HYPOKALEMIA: Status: RESOLVED | Noted: 2019-09-24 | Resolved: 2019-09-25

## 2019-09-25 PROBLEM — E16.2 HYPOGLYCEMIA: Status: RESOLVED | Noted: 2019-09-24 | Resolved: 2019-09-25

## 2019-09-25 PROBLEM — K92.2 GI BLEED: Status: RESOLVED | Noted: 2019-09-24 | Resolved: 2019-09-25

## 2019-09-25 LAB
ALBUMIN SERPL BCP-MCNC: 2 G/DL (ref 3.5–5.2)
ALP SERPL-CCNC: 69 U/L (ref 55–135)
ALT SERPL W/O P-5'-P-CCNC: 15 U/L (ref 10–44)
ANION GAP SERPL CALC-SCNC: 8 MMOL/L (ref 8–16)
AST SERPL-CCNC: 15 U/L (ref 10–40)
BASOPHILS # BLD AUTO: 0.06 K/UL (ref 0–0.2)
BASOPHILS NFR BLD: 0.5 % (ref 0–1.9)
BILIRUB SERPL-MCNC: 0.3 MG/DL (ref 0.1–1)
BUN SERPL-MCNC: 9 MG/DL (ref 6–20)
CALCIUM SERPL-MCNC: 8.5 MG/DL (ref 8.7–10.5)
CHLORIDE SERPL-SCNC: 97 MMOL/L (ref 95–110)
CO2 SERPL-SCNC: 26 MMOL/L (ref 23–29)
CREAT SERPL-MCNC: 0.6 MG/DL (ref 0.5–1.4)
DIFFERENTIAL METHOD: ABNORMAL
EOSINOPHIL # BLD AUTO: 0.3 K/UL (ref 0–0.5)
EOSINOPHIL NFR BLD: 2.5 % (ref 0–8)
ERYTHROCYTE [DISTWIDTH] IN BLOOD BY AUTOMATED COUNT: 17.4 % (ref 11.5–14.5)
EST. GFR  (AFRICAN AMERICAN): >60 ML/MIN/1.73 M^2
EST. GFR  (NON AFRICAN AMERICAN): >60 ML/MIN/1.73 M^2
GLUCOSE SERPL-MCNC: 87 MG/DL (ref 70–110)
HCT VFR BLD AUTO: 33.4 % (ref 40–54)
HGB BLD-MCNC: 9.7 G/DL (ref 14–18)
LYMPHOCYTES # BLD AUTO: 2.5 K/UL (ref 1–4.8)
LYMPHOCYTES NFR BLD: 22.3 % (ref 18–48)
MCH RBC QN AUTO: 23.5 PG (ref 27–31)
MCHC RBC AUTO-ENTMCNC: 29 G/DL (ref 32–36)
MCV RBC AUTO: 81 FL (ref 82–98)
MONOCYTES # BLD AUTO: 1 K/UL (ref 0.3–1)
MONOCYTES NFR BLD: 9 % (ref 4–15)
NEUTROPHILS # BLD AUTO: 7.5 K/UL (ref 1.8–7.7)
NEUTROPHILS NFR BLD: 66.1 % (ref 38–73)
PLATELET # BLD AUTO: 417 K/UL (ref 150–350)
PMV BLD AUTO: 9 FL (ref 9.2–12.9)
POCT GLUCOSE: 76 MG/DL (ref 70–110)
POTASSIUM SERPL-SCNC: 4.3 MMOL/L (ref 3.5–5.1)
PROT SERPL-MCNC: 6.9 G/DL (ref 6–8.4)
RBC # BLD AUTO: 4.12 M/UL (ref 4.6–6.2)
SODIUM SERPL-SCNC: 131 MMOL/L (ref 136–145)
WBC # BLD AUTO: 11.38 K/UL (ref 3.9–12.7)

## 2019-09-25 PROCEDURE — 80053 COMPREHEN METABOLIC PANEL: CPT

## 2019-09-25 PROCEDURE — 25000003 PHARM REV CODE 250: Performed by: INTERNAL MEDICINE

## 2019-09-25 PROCEDURE — 25000242 PHARM REV CODE 250 ALT 637 W/ HCPCS: Performed by: INTERNAL MEDICINE

## 2019-09-25 PROCEDURE — 25000003 PHARM REV CODE 250: Performed by: NURSE PRACTITIONER

## 2019-09-25 PROCEDURE — 99232 PR SUBSEQUENT HOSPITAL CARE,LEVL II: ICD-10-PCS | Mod: ,,, | Performed by: NURSE PRACTITIONER

## 2019-09-25 PROCEDURE — 36415 COLL VENOUS BLD VENIPUNCTURE: CPT

## 2019-09-25 PROCEDURE — 85025 COMPLETE CBC W/AUTO DIFF WBC: CPT

## 2019-09-25 PROCEDURE — 94640 AIRWAY INHALATION TREATMENT: CPT

## 2019-09-25 PROCEDURE — 94761 N-INVAS EAR/PLS OXIMETRY MLT: CPT

## 2019-09-25 PROCEDURE — 99232 SBSQ HOSP IP/OBS MODERATE 35: CPT | Mod: ,,, | Performed by: NURSE PRACTITIONER

## 2019-09-25 PROCEDURE — 63600175 PHARM REV CODE 636 W HCPCS: Performed by: INTERNAL MEDICINE

## 2019-09-25 RX ORDER — PANTOPRAZOLE SODIUM 40 MG/1
40 TABLET, DELAYED RELEASE ORAL 2 TIMES DAILY
Status: DISCONTINUED | OUTPATIENT
Start: 2019-09-25 | End: 2019-09-25 | Stop reason: HOSPADM

## 2019-09-25 RX ORDER — SODIUM CHLORIDE 0.9 % (FLUSH) 0.9 %
10 SYRINGE (ML) INJECTION
Status: DISCONTINUED | OUTPATIENT
Start: 2019-09-25 | End: 2019-09-25 | Stop reason: HOSPADM

## 2019-09-25 RX ORDER — FUROSEMIDE 10 MG/ML
20 INJECTION INTRAMUSCULAR; INTRAVENOUS ONCE
Status: COMPLETED | OUTPATIENT
Start: 2019-09-25 | End: 2019-09-25

## 2019-09-25 RX ORDER — SPIRONOLACTONE 50 MG/1
50 TABLET, FILM COATED ORAL DAILY
Qty: 30 TABLET | Refills: 0 | Status: SHIPPED | OUTPATIENT
Start: 2019-09-26 | End: 2019-10-26

## 2019-09-25 RX ORDER — OXYCODONE HYDROCHLORIDE 10 MG/1
10 TABLET ORAL
Qty: 3 TABLET | Refills: 0 | Status: SHIPPED | OUTPATIENT
Start: 2019-09-25 | End: 2019-09-28

## 2019-09-25 RX ADMIN — OXYCODONE HYDROCHLORIDE 10 MG: 5 TABLET ORAL at 07:09

## 2019-09-25 RX ADMIN — OXYCODONE HYDROCHLORIDE 10 MG: 5 TABLET ORAL at 11:09

## 2019-09-25 RX ADMIN — OXYCODONE HYDROCHLORIDE 10 MG: 5 TABLET ORAL at 03:09

## 2019-09-25 RX ADMIN — DULOXETINE HYDROCHLORIDE 20 MG: 20 CAPSULE, DELAYED RELEASE ORAL at 09:09

## 2019-09-25 RX ADMIN — BUDESONIDE 0.5 MG: 0.5 SUSPENSION RESPIRATORY (INHALATION) at 08:09

## 2019-09-25 RX ADMIN — TAMSULOSIN HYDROCHLORIDE 0.4 MG: 0.4 CAPSULE ORAL at 09:09

## 2019-09-25 RX ADMIN — MISOPROSTOL 200 MCG: 200 TABLET ORAL at 06:09

## 2019-09-25 RX ADMIN — SPIRONOLACTONE 50 MG: 25 TABLET ORAL at 09:09

## 2019-09-25 RX ADMIN — PANTOPRAZOLE SODIUM 40 MG: 40 TABLET, DELAYED RELEASE ORAL at 09:09

## 2019-09-25 RX ADMIN — Medication 100 MG: at 09:09

## 2019-09-25 RX ADMIN — FUROSEMIDE 20 MG: 10 INJECTION, SOLUTION INTRAMUSCULAR; INTRAVENOUS at 09:09

## 2019-09-25 RX ADMIN — GABAPENTIN 200 MG: 100 CAPSULE ORAL at 09:09

## 2019-09-25 RX ADMIN — ARFORMOTEROL TARTRATE 15 MCG: 15 SOLUTION RESPIRATORY (INHALATION) at 08:09

## 2019-09-25 NOTE — PLAN OF CARE
Pt VSS today.  Pt seen by GI and Surgery.  Pt did not have any more bleeding from colostomy since bag was changed this am.  Prn pain meds given as ordered.  CM consulted for discharge planning due to pt not having much help at home and the need for transportation to MD appts as well as possibly a need for home health.

## 2019-09-25 NOTE — PLAN OF CARE
Multiple referrals to home health companies, no accepting company.  Patient was aware that it was likely that no company would accept.  Patient will follow up with his assigned PCP     09/25/19 5228   Final Note   Assessment Type Final Discharge Note   Anticipated Discharge Disposition Home   Right [facility] Referral Info   Post Acute Recommendation No Care

## 2019-09-25 NOTE — ASSESSMENT & PLAN NOTE
Peristoma irritation that appeared to be the source of bleeding. Wound care consulted. No continued overt bleeding from site.   Hgb stable.   Can follow up with established surgeon as outpatient.

## 2019-09-25 NOTE — SUBJECTIVE & OBJECTIVE
No current facility-administered medications on file prior to encounter.      Current Outpatient Medications on File Prior to Encounter   Medication Sig    albuterol (PROVENTIL/VENTOLIN HFA) 90 mcg/actuation inhaler Inhale 2 puffs into the lungs every 6 (six) hours as needed.    atorvastatin (LIPITOR) 20 MG tablet Take 20 mg by mouth.    gabapentin (NEURONTIN) 100 MG capsule Take 200 mg by mouth 3 (three) times daily.     pantoprazole (PROTONIX) 40 MG tablet Take 40 mg by mouth once daily.     tamsulosin (FLOMAX) 0.4 mg Cap Take 0.4 mg by mouth once daily.     thiamine mononitrate, vit B1, (VITAMIN B-1, MONONITRATE,) 100 mg Tab Take 100 mg by mouth.    umeclidinium-vilanterol (ANORO ELLIPTA) 62.5-25 mcg/actuation DsDv Inhale 1 puff into the lungs.    [DISCONTINUED] DULoxetine (CYMBALTA) 20 MG capsule Take 20 mg by mouth.    [DISCONTINUED] spironolactone (ALDACTONE) 50 MG tablet Take 50 mg by mouth.    fentaNYL (DURAGESIC) 75 mcg/hr Place 1 patch onto the skin every 72 hours.    hydrocodone-acetaminophen 10-325mg (NORCO)  mg Tab Take by mouth.    methocarbamol (ROBAXIN) 750 MG Tab Take 500 mg by mouth 4 (four) times daily.    oxyCODONE-acetaminophen (PERCOCET) 5-325 mg per tablet TK 1 T PO  Q 12 H PRN P FOR UP TO 5 DAYS    sulfamethoxazole-trimethoprim 400-80mg (BACTRIM,SEPTRA) 400-80 mg per tablet     [DISCONTINUED] ALBUTEROL INHL Inhale into the lungs.    [DISCONTINUED] ranitidine (ZANTAC) 150 MG capsule Take 150 mg by mouth 2 (two) times daily.    [DISCONTINUED] SIMVASTATIN (ZOCOR ORAL) Take by mouth.       Review of patient's allergies indicates:   Allergen Reactions    Asa [aspirin]     Nsaids (non-steroidal anti-inflammatory drug)     Tramadol        Past Medical History:   Diagnosis Date    COPD (chronic obstructive pulmonary disease)     GERD (gastroesophageal reflux disease)     History of necrotic bowel 07/2019    Hypercholesteremia     Peptic ulcer     Shoulder pain       Past Surgical History:   Procedure Laterality Date    BACK SURGERY      COLECTOMY  07/2019    COLON SURGERY      COLOSTOMY  07/2019    LUNG BIOPSY      NECK SURGERY      peptic ulcer surgery       Family History     None        Tobacco Use    Smoking status: Current Every Day Smoker     Packs/day: 1.00     Types: Cigarettes    Smokeless tobacco: Never Used   Substance and Sexual Activity    Alcohol use: No    Drug use: No    Sexual activity: Not on file     Review of Systems   Constitutional: Positive for diaphoresis. Negative for appetite change, chills, fatigue, fever and unexpected weight change.   HENT: Negative for congestion and sore throat.    Respiratory: Negative for cough, chest tightness, shortness of breath and wheezing.    Cardiovascular: Negative for chest pain, palpitations and leg swelling.   Gastrointestinal: Positive for abdominal pain and blood in stool. Negative for abdominal distention, constipation, diarrhea, nausea, rectal pain and vomiting.   Genitourinary: Negative for decreased urine volume, difficulty urinating, dysuria, flank pain, frequency, hematuria and urgency.   Musculoskeletal: Negative for arthralgias, back pain and myalgias.   Skin: Negative for pallor, rash and wound.   Neurological: Negative for dizziness, syncope, weakness, light-headedness, numbness and headaches.   Psychiatric/Behavioral: Negative for confusion. The patient is not nervous/anxious.    All other systems reviewed and are negative.    Objective:     Vital Signs (Most Recent):  Temp: 98.4 °F (36.9 °C) (09/24/19 1905)  Pulse: 96 (09/24/19 2005)  Resp: 16 (09/24/19 2005)  BP: (!) 94/56 (09/24/19 2005)  SpO2: 96 % (09/24/19 1933) Vital Signs (24h Range):  Temp:  [98.1 °F (36.7 °C)-99 °F (37.2 °C)] 98.4 °F (36.9 °C)  Pulse:  [] 96  Resp:  [11-24] 16  SpO2:  [96 %-100 %] 96 %  BP: ()/() 94/56     Weight: 59 kg (130 lb 1.1 oz)  Body mass index is 20.99 kg/m².    Physical Exam    Constitutional: He is oriented to person, place, and time. He appears well-developed.   HENT:   Head: Normocephalic and atraumatic.   Eyes: Conjunctivae and EOM are normal.   Neck: Normal range of motion. No thyromegaly present.   Cardiovascular: Normal rate and regular rhythm.   Pulmonary/Chest: Effort normal. No respiratory distress.   Abdominal:   Soft, +fullness in BUQ consistent with hepatomegaly; LLQ colostomy pink and viable without any lesion, blood in bag or around ostomy   Musculoskeletal: Normal range of motion. He exhibits no edema or tenderness.   Neurological: He is alert and oriented to person, place, and time.   Skin: Skin is warm and dry. Capillary refill takes less than 2 seconds. No rash noted.   Psychiatric: He has a normal mood and affect.       Significant Labs:  CBC:   Recent Labs   Lab 09/24/19  1321   WBC 11.50   RBC 4.22*   HGB 10.2*   HCT 34.8*   *   MCV 83   MCH 24.2*   MCHC 29.3*     BMP:   Recent Labs   Lab 09/24/19  0324   GLU 76      K 3.9      CO2 26   BUN 8   CREATININE 0.5   CALCIUM 7.8*   MG 1.3*       Significant Diagnostics:  CT: I have reviewed all pertinent results/findings within the past 24 hours and my personal findings are:  no acute intra-abdominal findings to explain pain   FINDINGS:  No lung infiltrates.  There are bullous changes in blebs noted at the lung bases.    Liver appears enlarged.  There are couple of small hepatic cysts.  No definite liver masses.  No focal splenic masses.    No pancreatic masses.    There may be a small gallstone.  Gallbladder wall may be mildly thickened.  No definite bile duct dilatation.    The kidneys are normal. No obstructive uropathy.    There is atherosclerosis of the abdominal aorta.  There appears to be aortic bypass graft present.    Patient has a ostomy identified in the mid abdomen.  No evidence of bowel obstruction.  There is mild ascites around the liver and in the pelvis.    Bladder is normal.    Skeletal  structures are intact.      Impression       There is ostomy identified in the mid abdomen.  No evidence of bowel obstruction.  No definite acute bowel abnormalities.    There may be a gallstone present.  Gallbladder wall may be mildly thickened.  Consider gallbladder ultrasound.    The liver is enlarged measuring 22 cm in length.  There are couple small hepatic cysts.    There is mild ascites identified around the liver and extending into the pelvis.    There are chronic changes at the lung bases.

## 2019-09-25 NOTE — PLAN OF CARE
CM spoke to patient regarding discharge plan.  Patient inquired about home health services.  CM informed him that referral will be made but it is unlikely that a company will accept.  Patient verbalized understanding.     HUI requested an ambulatory home health order from Dr Monson.

## 2019-09-25 NOTE — PROGRESS NOTES
Follow up with Mr. Garcia today. Pain has somewhat improved. No output noted in his pouch, and he reports no output since his pouch change yesterday. Stoma is pink and moist, no bleeding noted. He discharging home today. Encouraged patient to follow up with his surgeon as he has not had a follow up appointment since hospital discharge, and to follow up with [facility] ostomy clinic if he continues to have issues with rozina stomal breakdown.

## 2019-09-25 NOTE — CONSULTS
Referrals made via [facility] to the following:  [facility]  [place]  [facility]  [facility]  [facility]    Patient understands that CM may not be able to find an accepting home health company.

## 2019-09-25 NOTE — PLAN OF CARE
POC reviewed with pt, verbalized understanding. SR-ST on monitor. BP stable. Afebrile. Sats stable on room air. LLQ colostomy without stool output this shift, protruding stoma, c/d/I dsg. C/o persistent pain around colostomy, managed with prn oxycodone q4h. Blood glucose monitored/managed. Voiding per urinal. Reminded to turn/reposition in bed at least q2h. Call light and personal items within reach. Instructed to call for assistance.

## 2019-09-25 NOTE — PROGRESS NOTES
[facility] -   Gastroenterology  Progress Note    Patient Name: Arsalan Garcia Jr.  MRN: [id number]  Admission Date: 9/23/2019  Hospital Length of Stay: 1 days  Code Status: Full Code   Attending Provider: Baldev Monson MD  Consulting Provider: Priyanka Wilson NP  Primary Care Physician: Provider Notinsystem  Principal Problem: Ischemic colitis      Subjective:     Interval History: no overt bleeding. Still having pain at ostomy site but has improved some since yesterday.     Review of Systems   Constitutional: Negative for activity change and appetite change.        As per interval history above   Respiratory: Negative for cough and shortness of breath.    Cardiovascular: Negative for chest pain.   Gastrointestinal: Negative for abdominal pain, blood in stool, constipation, diarrhea, nausea and vomiting.   Skin: Negative for color change and rash.     Objective:     Vital Signs (Most Recent):  Temp: 97.8 °F (36.6 °C) (09/25/19 1127)  Pulse: 95 (09/25/19 0834)  Resp: 11 (09/25/19 0834)  BP: (!) 131/97 (09/25/19 0730)  SpO2: 97 % (09/25/19 0834) Vital Signs (24h Range):  Temp:  [97.8 °F (36.6 °C)-98.9 °F (37.2 °C)] 97.8 °F (36.6 °C)  Pulse:  [] 95  Resp:  [11-16] 11  SpO2:  [95 %-100 %] 97 %  BP: ()/(56-97) 131/97     Weight: 43.6 kg (96 lb 1.9 oz) (09/25/19 0612)  Body mass index is 15.51 kg/m².      Intake/Output Summary (Last 24 hours) at 9/25/2019 1217  Last data filed at 9/25/2019 1000  Gross per 24 hour   Intake 2051.67 ml   Output 2845 ml   Net -793.33 ml       Lines/Drains/Airways     Drain                 Colostomy 07/01/19 1407 LLQ 85 days          Peripheral Intravenous Line                 Peripheral IV - Single Lumen 09/24/19 0647 18 G Left Antecubital 1 day                Physical Exam   Constitutional: He is oriented to person, place, and time. No distress.   Cardiovascular: Normal rate, regular rhythm and normal heart sounds.   No murmur heard.  Pulmonary/Chest: Effort  normal and breath sounds normal. No stridor. No respiratory distress.   Abdominal: Soft. Bowel sounds are normal. He exhibits no distension. There is no tenderness.   Neurological: He is alert and oriented to person, place, and time. No cranial nerve deficit.   Skin: Skin is warm and dry.   Psychiatric: He has a normal mood and affect. His behavior is normal.       Significant Labs:  CBC:   Recent Labs   Lab 09/24/19  0324 09/24/19  1321 09/25/19  0331   WBC 10.65 11.50 11.38   HGB 9.7* 10.2* 9.7*   HCT 32.5* 34.8* 33.4*   * 378* 417*     CMP:   Recent Labs   Lab 09/25/19  0331   GLU 87   CALCIUM 8.5*   ALBUMIN 2.0*   PROT 6.9   *   K 4.3   CO2 26   CL 97   BUN 9   CREATININE 0.6   ALKPHOS 69   ALT 15   AST 15   BILITOT 0.3         Significant Imaging:  Imaging results within the past 24 hours have been reviewed.    Assessment/Plan:     * History of ischemic colitis s/p sigmoid colectomy with end colostomy creation 7/3/19  Peristoma irritation that appeared to be the source of bleeding. Wound care consulted. No continued overt bleeding from site.   Hgb stable.   Can follow up with established surgeon as outpatient.         History of peptic ulcer  A recent EGD revealed a gastric ulcer with visible artery that was clipped. No overt bleeding.     Chronic hepatitis C without hepatic coma  MELD-Na score: 6 at 9/25/2019  3:31 AM  MELD score: 6 at 9/25/2019  3:31 AM  Calculated from:  Serum Creatinine: 0.6 mg/dL (Rounded to 1 mg/dL) at 9/25/2019  3:31 AM  Serum Sodium: 131 mmol/L at 9/25/2019  3:31 AM  Total Bilirubin: 0.3 mg/dL (Rounded to 1 mg/dL) at 9/25/2019  3:31 AM  INR(ratio): 1.0 at 9/24/2019  3:24 AM  Age: 59 years    Patient has mild ascites on CT scan but it could be fluid from his prior surgical intervention. No evidence or stigmata of chronic liver disease and no evident portal hypertension. CT Scan was done without contrast so it is a poor study to evaluate liver and GI anatomy.     Component  Name Value Ref Range   HCV RNA Quantification Interpretation Detected (A) Undetected    HCV RNA Quantification 317 IU/mL   HCV RNA Quantification Log Value 2.50     Specimen Collected on   Blood - Vein 7/31/2019 9:06 PM   Result Narrative     Patient will need outpatient therapy, referral to [facility] clinic.         Thank you for your consult. I will follow-up with patient. Please contact us if you have any additional questions.    Priyanka Wilson NP  Gastroenterology  [facility]

## 2019-09-25 NOTE — CONSULTS
[facility] -   General Surgery  Consult Note    Patient Name: Arsalan Garcia Jr.  MRN: [id number]  Code Status: Full Code  Admission Date: 9/23/2019  Hospital Length of Stay: 0 days  Attending Physician: Narayan Valadez MD  Primary Care Provider: Provider Adalberto    Patient information was obtained from patient, past medical records and ER records.     Inpatient consult to General Surgery  Consult performed by: Twan Gardner MD  Consult ordered by: Baldev Monson MD        Subjective:     Principal Problem: GI bleed    History of Present Illness: 58yo M who is admitted to the hospital medicine service for abdominal pain and possible lower GI bleed. Pt recently underwent sigmoid colectomy with end colostomy on 7/3/19 at [facility] for ischemic colitis. He presented to the [facility] ED complaining of abdominal pain and bleeding in his stool. He underwent a CT scan in the ED where no evidence of acute process was seen. Surgery was consulted for evaluation of possible stoma pain/bleed. Denies fevers, chills, nausea or vomiting.     No current facility-administered medications on file prior to encounter.      Current Outpatient Medications on File Prior to Encounter   Medication Sig    albuterol (PROVENTIL/VENTOLIN HFA) 90 mcg/actuation inhaler Inhale 2 puffs into the lungs every 6 (six) hours as needed.    atorvastatin (LIPITOR) 20 MG tablet Take 20 mg by mouth.    gabapentin (NEURONTIN) 100 MG capsule Take 200 mg by mouth 3 (three) times daily.     pantoprazole (PROTONIX) 40 MG tablet Take 40 mg by mouth once daily.     tamsulosin (FLOMAX) 0.4 mg Cap Take 0.4 mg by mouth once daily.     thiamine mononitrate, vit B1, (VITAMIN B-1, MONONITRATE,) 100 mg Tab Take 100 mg by mouth.    umeclidinium-vilanterol (ANORO ELLIPTA) 62.5-25 mcg/actuation DsDv Inhale 1 puff into the lungs.    [DISCONTINUED] DULoxetine (CYMBALTA) 20 MG capsule Take 20 mg by mouth.    [DISCONTINUED] spironolactone (ALDACTONE) 50  MG tablet Take 50 mg by mouth.    fentaNYL (DURAGESIC) 75 mcg/hr Place 1 patch onto the skin every 72 hours.    hydrocodone-acetaminophen 10-325mg (NORCO)  mg Tab Take by mouth.    methocarbamol (ROBAXIN) 750 MG Tab Take 500 mg by mouth 4 (four) times daily.    oxyCODONE-acetaminophen (PERCOCET) 5-325 mg per tablet TK 1 T PO  Q 12 H PRN P FOR UP TO 5 DAYS    sulfamethoxazole-trimethoprim 400-80mg (BACTRIM,SEPTRA) 400-80 mg per tablet     [DISCONTINUED] ALBUTEROL INHL Inhale into the lungs.    [DISCONTINUED] ranitidine (ZANTAC) 150 MG capsule Take 150 mg by mouth 2 (two) times daily.    [DISCONTINUED] SIMVASTATIN (ZOCOR ORAL) Take by mouth.       Review of patient's allergies indicates:   Allergen Reactions    Asa [aspirin]     Nsaids (non-steroidal anti-inflammatory drug)     Tramadol        Past Medical History:   Diagnosis Date    COPD (chronic obstructive pulmonary disease)     GERD (gastroesophageal reflux disease)     History of necrotic bowel 07/2019    Hypercholesteremia     Peptic ulcer     Shoulder pain      Past Surgical History:   Procedure Laterality Date    BACK SURGERY      COLECTOMY  07/2019    COLON SURGERY      COLOSTOMY  07/2019    LUNG BIOPSY      NECK SURGERY      peptic ulcer surgery       Family History     None        Tobacco Use    Smoking status: Current Every Day Smoker     Packs/day: 1.00     Types: Cigarettes    Smokeless tobacco: Never Used   Substance and Sexual Activity    Alcohol use: No    Drug use: No    Sexual activity: Not on file     Review of Systems   Constitutional: Positive for diaphoresis. Negative for appetite change, chills, fatigue, fever and unexpected weight change.   HENT: Negative for congestion and sore throat.    Respiratory: Negative for cough, chest tightness, shortness of breath and wheezing.    Cardiovascular: Negative for chest pain, palpitations and leg swelling.   Gastrointestinal: Positive for abdominal pain and blood in  stool. Negative for abdominal distention, constipation, diarrhea, nausea, rectal pain and vomiting.   Genitourinary: Negative for decreased urine volume, difficulty urinating, dysuria, flank pain, frequency, hematuria and urgency.   Musculoskeletal: Negative for arthralgias, back pain and myalgias.   Skin: Negative for pallor, rash and wound.   Neurological: Negative for dizziness, syncope, weakness, light-headedness, numbness and headaches.   Psychiatric/Behavioral: Negative for confusion. The patient is not nervous/anxious.    All other systems reviewed and are negative.    Objective:     Vital Signs (Most Recent):  Temp: 98.4 °F (36.9 °C) (09/24/19 1905)  Pulse: 96 (09/24/19 2005)  Resp: 16 (09/24/19 2005)  BP: (!) 94/56 (09/24/19 2005)  SpO2: 96 % (09/24/19 1933) Vital Signs (24h Range):  Temp:  [98.1 °F (36.7 °C)-99 °F (37.2 °C)] 98.4 °F (36.9 °C)  Pulse:  [] 96  Resp:  [11-24] 16  SpO2:  [96 %-100 %] 96 %  BP: ()/() 94/56     Weight: 59 kg (130 lb 1.1 oz)  Body mass index is 20.99 kg/m².    Physical Exam   Constitutional: He is oriented to person, place, and time. He appears well-developed.   HENT:   Head: Normocephalic and atraumatic.   Eyes: Conjunctivae and EOM are normal.   Neck: Normal range of motion. No thyromegaly present.   Cardiovascular: Normal rate and regular rhythm.   Pulmonary/Chest: Effort normal. No respiratory distress.   Abdominal:   Soft, +fullness in BUQ consistent with hepatomegaly; LLQ colostomy pink and viable without any lesion, blood in bag or around ostomy   Musculoskeletal: Normal range of motion. He exhibits no edema or tenderness.   Neurological: He is alert and oriented to person, place, and time.   Skin: Skin is warm and dry. Capillary refill takes less than 2 seconds. No rash noted.   Psychiatric: He has a normal mood and affect.       Significant Labs:  CBC:   Recent Labs   Lab 09/24/19  1321   WBC 11.50   RBC 4.22*   HGB 10.2*   HCT 34.8*   *   MCV 83    MCH 24.2*   MCHC 29.3*     BMP:   Recent Labs   Lab 09/24/19  0324   GLU 76      K 3.9      CO2 26   BUN 8   CREATININE 0.5   CALCIUM 7.8*   MG 1.3*       Significant Diagnostics:  CT: I have reviewed all pertinent results/findings within the past 24 hours and my personal findings are:  no acute intra-abdominal findings to explain pain   FINDINGS:  No lung infiltrates.  There are bullous changes in blebs noted at the lung bases.    Liver appears enlarged.  There are couple of small hepatic cysts.  No definite liver masses.  No focal splenic masses.    No pancreatic masses.    There may be a small gallstone.  Gallbladder wall may be mildly thickened.  No definite bile duct dilatation.    The kidneys are normal. No obstructive uropathy.    There is atherosclerosis of the abdominal aorta.  There appears to be aortic bypass graft present.    Patient has a ostomy identified in the mid abdomen.  No evidence of bowel obstruction.  There is mild ascites around the liver and in the pelvis.    Bladder is normal.    Skeletal structures are intact.      Impression       There is ostomy identified in the mid abdomen.  No evidence of bowel obstruction.  No definite acute bowel abnormalities.    There may be a gallstone present.  Gallbladder wall may be mildly thickened.  Consider gallbladder ultrasound.    The liver is enlarged measuring 22 cm in length.  There are couple small hepatic cysts.    There is mild ascites identified around the liver and extending into the pelvis.    There are chronic changes at the lung bases.         Assessment/Plan:     * Acute blood loss anemia secondary to GI bleed  Currently stable.  - No current bleed seen at ostomy site above skin.   - If continues to have blood in bag or stool, would recommend evaluation with colonoscopy if warranted with GI medicine  - Discussed with patient that if he is having trouble pouching or having pain surrounding his stoma due to skin irritation from  poorly fitting appliances, he should followup with ostomy nurse clinic at [facility]  - Instructed patient to followup with his surgeon at [facility] for further care.    Tobacco use  Encouraged smoking cessation    History of ischemic colitis s/p sigmoid colectomy with end colostomy creation 7/3/19  Stable. See plan for Acute blood loss anemia secondary to GI bleed    Chronic hepatitis C without hepatic coma  Management per primary team      VTE Risk Mitigation (From admission, onward)         Ordered     Place sequential compression device  Until discontinued      09/24/19 0223     IP VTE LOW RISK PATIENT  Once      09/24/19 0223                Thank you for your consult. I will sign off. Please contact us if you have any additional questions.    Twan Garnder MD  General Surgery  [facility]

## 2019-09-25 NOTE — ASSESSMENT & PLAN NOTE
Currently stable.  - No current bleed seen at ostomy site above skin.   - If continues to have blood in bag or stool, would recommend evaluation with colonoscopy if warranted with GI medicine  - Discussed with patient that if he is having trouble pouching or having pain surrounding his stoma due to skin irritation from poorly fitting appliances, he should followup with ostomy nurse clinic at [facility]  - Instructed patient to followup with his surgeon at [facility] for further care.

## 2019-09-25 NOTE — ASSESSMENT & PLAN NOTE
MELD-Na score: 6 at 9/25/2019  3:31 AM  MELD score: 6 at 9/25/2019  3:31 AM  Calculated from:  Serum Creatinine: 0.6 mg/dL (Rounded to 1 mg/dL) at 9/25/2019  3:31 AM  Serum Sodium: 131 mmol/L at 9/25/2019  3:31 AM  Total Bilirubin: 0.3 mg/dL (Rounded to 1 mg/dL) at 9/25/2019  3:31 AM  INR(ratio): 1.0 at 9/24/2019  3:24 AM  Age: 59 years    Patient has mild ascites on CT scan but it could be fluid from his prior surgical intervention. No evidence or stigmata of chronic liver disease and no evident portal hypertension. CT Scan was done without contrast so it is a poor study to evaluate liver and GI anatomy.     Component Name Value Ref Range   HCV RNA Quantification Interpretation Detected (A) Undetected    HCV RNA Quantification 317 IU/mL   HCV RNA Quantification Log Value 2.50     Specimen Collected on   Blood - Vein 7/31/2019 9:06 PM   Result Narrative     Patient will need outpatient therapy, referral to [facility].

## 2019-09-25 NOTE — DISCHARGE SUMMARY
[facility] Hospital Medicine  Discharge Summary      Patient Name: Arsalan Garcia Jr.  MRN: [id number]  Admission Date: 9/23/2019  Hospital Length of Stay: 1 days  Discharge Date and Time:  09/25/2019 11:28 AM  Attending Physician: Baldev Monson MD   Discharging Provider: Baldev Monson MD  Primary Care Provider: Provider Notinsystem      HPI:   Mr. Garcia is a 59 y.o. male with  a PMHx of COPD, HLD, PUD, Hep C, chronic pain syndrome, tobacco use and recent h/o ischemic colitis s/p sigmoid colectomy with end colostomy creation 7/3/19 at [facility].  He presented to the ED with complaints of abdominal pain around his colostomy that has progressively worsened over the past few days.  Associated dark red blood noted in his colostomy bag today, and diaphoresis.  No aggravating or alleviating factors.  Denies any abdominal distention, decreased appetite, nausea, vomiting, diarrhea, constipation, hematemesis, dysuria, hematuria, back pain, chest pain, shortness of breath, lightheadedness or dizziness, syncope, weakness, fever or chills.  Initial workup in the ED resulted H&H of 8.5/28.5, platelets 487, BUN 9, creatinine 0.5, potassium 2.8, albumin 1.9, glucose 57, lactic 1.4, Hemoccult positive, unremarkable UA and CXR.  CT of the abdomen/pelvis showed ostomy in the mid abdomen with no evidence of bowel obstruction or definite acute bowel abnormalities, there may be a gallstone present with mild gallbladder wall thickening, enlarged liver measuring 22 cm in length with a couple small hepatic cysts, and mild ascites identified around the liver and extending into the pelvis.  Patient noted to be mildly tachycardic in the ED with heart rate max of 111 bpm, remained hemodynamically stable.  He received IV fluid resuscitation, IV Reglan and morphine in the ED.  Hospital Medicine was called for admission.      * No surgery found *      Hospital Course:   9/25/19-  Pt is seen at bedside . Today he had no  colostomy output . Stoma looks pink and no signs of bleed.   H&H remains stable 8.5>10.2>9.7 without intervention. Remains hemodynamically stable.   Pt was evaluated by GI service . After careful assessment it was suggested that less likely pt was having internal GI bleed but more likely around or from stoma .Stoma nurse evaluated the pt.  He is s/p colostomy creation 7/3/19 at [facility]. He reports he has not had any followup since his hospital discharge. He reports 10/10 pain to  stoma and peristomal area. Mild peristomal breakdown with no active bleeding noted. Stoma is pink and moist with rosebud appearance. Small spot on stoma at about 2 oclock suspicious for granuloma, he reports extreme pain to this site. Stoma nurse has performed wound care . General Surgery was consulted as requested by Stoma Nurse .     Dr. Gardner evaluated the pt and suggested that no current bleed seen at ostomy site above skin. He also recommended that patient  should followup with ostomy nurse clinic at [facility] and  followup with his surgeon at [facility] for further care.    At this point pt is medically stable and deemed suitable for discharge home.  We set up pt with home health service for nursing and ostomy care.      Consults:   Consults (From admission, onward)        Status Ordering Provider     Inpatient consult to Gastroenterology  Once     Provider:  Abiodun Truong MD    Completed AMY MORGAN     Inpatient consult to General Surgery  Once     Provider:  Twan Gardner MD    Completed MICHAEL ECHEVERRIA     Inpatient consult to Registered Dietitian/Nutritionist  Once     Provider:  (Not yet assigned)    Completed MICHAEL ECHEVERRIA     Inpatient consult to Social Work  Once     Provider:  (Not yet assigned)    Completed MICHAEL ECHEVERRIA     IP consult to case management  Once     Provider:  (Not yet assigned)    Completed MICHAEL ECHEVERRIA          History of ischemic colitis s/p sigmoid colectomy with end colostomy creation 7/3/19  -  Patient now with acute GI bleed.  See plan above.  - CT of the abdomen/pelvis was negative for acute bowel abnormalities.  - Wound care consult for ostomy management.  - GI consulted.      Final Active Diagnoses:    Diagnosis Date Noted POA    History of peptic ulcer [Z87.11] 09/24/2019 Not Applicable    Moderate malnutrition [E44.0] 09/24/2019 Unknown    History of ischemic colitis s/p sigmoid colectomy with end colostomy creation 7/3/19 [K55.9] 07/01/2019 Yes     Chronic    Chronic hepatitis C without hepatic coma [B18.2] 01/05/2018 Yes     Chronic    Tobacco use [Z72.0] 08/25/2014 Yes     Chronic      Problems Resolved During this Admission:    Diagnosis Date Noted Date Resolved POA    PRINCIPAL PROBLEM:  Acute blood loss anemia secondary to GI bleed [K92.2] 09/24/2019 09/25/2019 Yes    Hypokalemia [E87.6] 09/24/2019 09/25/2019 Yes    Hypoglycemia [E16.2] 09/24/2019 09/25/2019 Yes       Discharged Condition: stable    Disposition: Home-Health Care [facility]    Follow Up:  Follow-up Information     Provider Notinsystem.    Why:  Advise pt to see Ostomy  Nurse at Ostomy  clinic at [facility]            Please follow up.    Why:  Advise pt to follow up with colon surgeon for further care at our [facility]                Patient Instructions:      Ambulatory referral to Home Health   Referral Priority: Routine Referral Type: Home Health   Referral Reason: Specialty Services Required   Requested Specialty: Home Health Services   Number of Visits Requested: 1     Diet Cardiac   Order Comments: 2 gram sodium     Activity as tolerated       Significant Diagnostic Studies: Labs:   BMP:   Recent Labs   Lab 09/23/19 2302 09/24/19  0324 09/25/19  0331   GLU 57* 76 87    139 131*   K 2.8* 3.9 4.3    107 97   CO2 24 26 26   BUN 9 8 9   CREATININE 0.5 0.5 0.6   CALCIUM 7.6* 7.8* 8.5*   MG 1.3* 1.3*  --    , CMP   Recent Labs   Lab 09/23/19 2302 09/24/19  0324 09/25/19  0331    139 131*   K  2.8* 3.9 4.3    107 97   CO2 24 26 26   GLU 57* 76 87   BUN 9 8 9   CREATININE 0.5 0.5 0.6   CALCIUM 7.6* 7.8* 8.5*   PROT 6.4 6.9 6.9   ALBUMIN 1.9* 2.1* 2.0*   BILITOT 0.3 0.3 0.3   ALKPHOS 67 66 69   AST 12 18 15   ALT 15 19 15   ANIONGAP 7* 6* 8   ESTGFRAFRICA >60 >60 >60   EGFRNONAA >60 >60 >60    and CBC   Recent Labs   Lab 09/24/19  0324 09/24/19  1321 09/25/19  0331   WBC 10.65 11.50 11.38   HGB 9.7* 10.2* 9.7*   HCT 32.5* 34.8* 33.4*   * 378* 417*       Pending Diagnostic Studies:     None         Medications:  Reconciled Home Medications:      Medication List      START taking these medications    oxyCODONE 10 mg Tab immediate release tablet  Commonly known as:  ROXICODONE  Take 1 tablet (10 mg total) by mouth every 24 hours as needed.     spironolactone 50 MG tablet  Commonly known as:  ALDACTONE  Take 1 tablet (50 mg total) by mouth once daily. For fluid retention  Start taking on:  September 26, 2019        CONTINUE taking these medications    albuterol 90 mcg/actuation inhaler  Commonly known as:  PROVENTIL/VENTOLIN HFA  Inhale 2 puffs into the lungs every 6 (six) hours as needed.     atorvastatin 20 MG tablet  Commonly known as:  LIPITOR  Take 20 mg by mouth.     gabapentin 100 MG capsule  Commonly known as:  NEURONTIN  Take 200 mg by mouth 3 (three) times daily.     pantoprazole 40 MG tablet  Commonly known as:  PROTONIX  Take 40 mg by mouth once daily.     tamsulosin 0.4 mg Cap  Commonly known as:  FLOMAX  Take 0.4 mg by mouth once daily.     thiamine mononitrate (vit B1) 100 mg Tab  Commonly known as:  VITAMIN B-1 (MONONITRATE)  Take 100 mg by mouth.     umeclidinium-vilanterol 62.5-25 mcg/actuation Dsdv  Commonly known as:  ANORO ELLIPTA  Inhale 1 puff into the lungs.        STOP taking these medications    fentaNYL 75 mcg/hr  Commonly known as:  DURAGESIC     HYDROcodone-acetaminophen  mg per tablet  Commonly known as:  NORCO     methocarbamol 750 MG Tab  Commonly known as:   ROBAXIN     oxyCODONE-acetaminophen 5-325 mg per tablet  Commonly known as:  PERCOCET     sulfamethoxazole-trimethoprim 400-80mg 400-80 mg per tablet  Commonly known as:  BACTRIM,SEPTRA            Indwelling Lines/Drains at time of discharge:   Lines/Drains/Airways     Drain                 Colostomy 07/01/19 1407 LLQ 85 days                Time spent on the discharge of patient:  30  minutes  Patient was seen and examined on the date of discharge and determined to be suitable for discharge.    Critical care time spent on the evaluation and treatment of severe organ dysfunction, review of pertinent labs and imaging studies, discussions with consulting providers and discussions with patient/family: 30  minutes.     Baldev Monson MD  Department of Hospital Medicine  [facility]

## 2019-09-25 NOTE — HOSPITAL COURSE
9/25/19-  Pt is seen at bedside . Today he had no colostomy output . Stoma looks pink and no signs of bleed.   H&H remains stable 8.5>10.2>9.7 without intervention. Remains hemodynamically stable.   Pt was evaluated by GI service . After careful assessment it was suggested that less likely pt was having internal GI bleed but more likely around or from stoma .Stoma nurse evaluated the pt.  He is s/p colostomy creation 7/3/19 at [facility]. He reports he has not had any followup since his hospital discharge. He reports 10/10 pain to  stoma and peristomal area. Mild peristomal breakdown with no active bleeding noted. Stoma is pink and moist with rosebud appearance. Small spot on stoma at about 2 oclock suspicious for granuloma, he reports extreme pain to this site. Stoma nurse has performed wound care . General Surgery was consulted as requested by Stoma Nurse .     Dr. Gardner evaluated the pt and suggested that no current bleed seen at ostomy site above skin. He also recommended that patient  should followup with ostomy nurse clinic at [facility] and  followup with his surgeon at [facility] for further care.    At this point pt is medically stable and deemed suitable for discharge home.  We set up pt with home health service for nursing and ostomy care.

## 2019-09-25 NOTE — PROGRESS NOTES
RT came for breathing txs, but patient requests to eat breakfast first. Will come back later when done.

## 2019-09-25 NOTE — HPI
60yo M who is admitted to the hospital medicine service for abdominal pain and possible lower GI bleed. Pt recently underwent sigmoid colectomy with end colostomy on 7/3/19 at [facility] for ischemic colitis. He presented to the [facility] ED complaining of abdominal pain and bleeding in his stool. He underwent a CT scan in the ED where no evidence of acute process was seen. Surgery was consulted for evaluation of possible stoma pain/bleed. Denies fevers, chills, nausea or vomiting.

## 2019-09-29 LAB
BACTERIA BLD CULT: NORMAL
BACTERIA BLD CULT: NORMAL

## 2019-09-30 ENCOUNTER — HOSPITAL ENCOUNTER (EMERGENCY)
Facility: HOSPITAL | Age: 59
Discharge: HOME | End: 2019-09-30
Attending: EMERGENCY MEDICINE
Payer: MEDICAID

## 2019-09-30 VITALS
TEMPERATURE: 98 F | SYSTOLIC BLOOD PRESSURE: 115 MMHG | WEIGHT: 100 LBS | OXYGEN SATURATION: 95 % | RESPIRATION RATE: 16 BRPM | HEIGHT: 66 IN | HEART RATE: 88 BPM | DIASTOLIC BLOOD PRESSURE: 70 MMHG | BODY MASS INDEX: 16.07 KG/M2

## 2019-09-30 DIAGNOSIS — R10.31 RIGHT LOWER QUADRANT ABDOMINAL PAIN: Primary | ICD-10-CM

## 2019-09-30 LAB
ALBUMIN SERPL BCP-MCNC: 2.1 G/DL (ref 3.5–5.2)
ALP SERPL-CCNC: 77 U/L (ref 55–135)
ALT SERPL W/O P-5'-P-CCNC: 30 U/L (ref 10–44)
ANION GAP SERPL CALC-SCNC: 7 MMOL/L (ref 8–16)
AST SERPL-CCNC: 24 U/L (ref 10–40)
BASOPHILS # BLD AUTO: 0.04 K/UL (ref 0–0.2)
BASOPHILS NFR BLD: 0.5 % (ref 0–1.9)
BILIRUB SERPL-MCNC: 0.3 MG/DL (ref 0.1–1)
BILIRUB UR QL STRIP: NEGATIVE
BUN SERPL-MCNC: 18 MG/DL (ref 6–20)
CALCIUM SERPL-MCNC: 8.3 MG/DL (ref 8.7–10.5)
CHLORIDE SERPL-SCNC: 103 MMOL/L (ref 95–110)
CLARITY UR: CLEAR
CO2 SERPL-SCNC: 25 MMOL/L (ref 23–29)
COLOR UR: YELLOW
CREAT SERPL-MCNC: 0.6 MG/DL (ref 0.5–1.4)
DIFFERENTIAL METHOD: ABNORMAL
EOSINOPHIL # BLD AUTO: 0.2 K/UL (ref 0–0.5)
EOSINOPHIL NFR BLD: 2.9 % (ref 0–8)
ERYTHROCYTE [DISTWIDTH] IN BLOOD BY AUTOMATED COUNT: 17.3 % (ref 11.5–14.5)
EST. GFR  (AFRICAN AMERICAN): >60 ML/MIN/1.73 M^2
EST. GFR  (NON AFRICAN AMERICAN): >60 ML/MIN/1.73 M^2
GLUCOSE SERPL-MCNC: 97 MG/DL (ref 70–110)
GLUCOSE UR QL STRIP: NEGATIVE
HCT VFR BLD AUTO: 30.5 % (ref 40–54)
HGB BLD-MCNC: 9.2 G/DL (ref 14–18)
HGB UR QL STRIP: NEGATIVE
KETONES UR QL STRIP: NEGATIVE
LEUKOCYTE ESTERASE UR QL STRIP: NEGATIVE
LIPASE SERPL-CCNC: 47 U/L (ref 4–60)
LYMPHOCYTES # BLD AUTO: 2.1 K/UL (ref 1–4.8)
LYMPHOCYTES NFR BLD: 27.7 % (ref 18–48)
MCH RBC QN AUTO: 24 PG (ref 27–31)
MCHC RBC AUTO-ENTMCNC: 30.2 G/DL (ref 32–36)
MCV RBC AUTO: 80 FL (ref 82–98)
MONOCYTES # BLD AUTO: 0.7 K/UL (ref 0.3–1)
MONOCYTES NFR BLD: 9.2 % (ref 4–15)
NEUTROPHILS # BLD AUTO: 4.5 K/UL (ref 1.8–7.7)
NEUTROPHILS NFR BLD: 60.1 % (ref 38–73)
NITRITE UR QL STRIP: NEGATIVE
PH UR STRIP: 7 [PH] (ref 5–8)
PLATELET # BLD AUTO: 364 K/UL (ref 150–350)
PMV BLD AUTO: 8.9 FL (ref 9.2–12.9)
POTASSIUM SERPL-SCNC: 4 MMOL/L (ref 3.5–5.1)
PROT SERPL-MCNC: 7.3 G/DL (ref 6–8.4)
PROT UR QL STRIP: NEGATIVE
RBC # BLD AUTO: 3.83 M/UL (ref 4.6–6.2)
SODIUM SERPL-SCNC: 135 MMOL/L (ref 136–145)
SP GR UR STRIP: 1.01 (ref 1–1.03)
URN SPEC COLLECT METH UR: NORMAL
UROBILINOGEN UR STRIP-ACNC: NEGATIVE EU/DL
WBC # BLD AUTO: 7.61 K/UL (ref 3.9–12.7)

## 2019-09-30 PROCEDURE — 25500020 PHARM REV CODE 255: Performed by: EMERGENCY MEDICINE

## 2019-09-30 PROCEDURE — 96375 TX/PRO/DX INJ NEW DRUG ADDON: CPT

## 2019-09-30 PROCEDURE — 81003 URINALYSIS AUTO W/O SCOPE: CPT

## 2019-09-30 PROCEDURE — 96361 HYDRATE IV INFUSION ADD-ON: CPT

## 2019-09-30 PROCEDURE — 63600175 PHARM REV CODE 636 W HCPCS: Performed by: EMERGENCY MEDICINE

## 2019-09-30 PROCEDURE — 85025 COMPLETE CBC W/AUTO DIFF WBC: CPT

## 2019-09-30 PROCEDURE — 99285 EMERGENCY DEPT VISIT HI MDM: CPT | Mod: 25

## 2019-09-30 PROCEDURE — 83690 ASSAY OF LIPASE: CPT

## 2019-09-30 PROCEDURE — 96374 THER/PROPH/DIAG INJ IV PUSH: CPT

## 2019-09-30 PROCEDURE — 25000003 PHARM REV CODE 250: Performed by: EMERGENCY MEDICINE

## 2019-09-30 PROCEDURE — 80053 COMPREHEN METABOLIC PANEL: CPT

## 2019-09-30 RX ORDER — MORPHINE SULFATE 4 MG/ML
4 INJECTION, SOLUTION INTRAMUSCULAR; INTRAVENOUS
Status: COMPLETED | OUTPATIENT
Start: 2019-09-30 | End: 2019-09-30

## 2019-09-30 RX ORDER — ONDANSETRON 2 MG/ML
4 INJECTION INTRAMUSCULAR; INTRAVENOUS
Status: COMPLETED | OUTPATIENT
Start: 2019-09-30 | End: 2019-09-30

## 2019-09-30 RX ORDER — HYDROCODONE BITARTRATE AND ACETAMINOPHEN 5; 325 MG/1; MG/1
1 TABLET ORAL
Status: COMPLETED | OUTPATIENT
Start: 2019-09-30 | End: 2019-09-30

## 2019-09-30 RX ADMIN — MORPHINE SULFATE 4 MG: 4 INJECTION, SOLUTION INTRAMUSCULAR; INTRAVENOUS at 01:09

## 2019-09-30 RX ADMIN — HYDROCODONE BITARTRATE AND ACETAMINOPHEN 1 TABLET: 5; 325 TABLET ORAL at 03:09

## 2019-09-30 RX ADMIN — IOHEXOL 75 ML: 350 INJECTION, SOLUTION INTRAVENOUS at 01:09

## 2019-09-30 RX ADMIN — SODIUM CHLORIDE 1000 ML: 0.9 INJECTION, SOLUTION INTRAVENOUS at 01:09

## 2019-09-30 RX ADMIN — ONDANSETRON 4 MG: 2 INJECTION INTRAMUSCULAR; INTRAVENOUS at 01:09

## 2019-09-30 NOTE — ED PROVIDER NOTES
SCRIBE #1 NOTE: I, Gregory Olson/Jennifer Sebastian, am scribing for, and in the presence of, Arturo Ch MD. I have scribed the entire note.       History     Chief Complaint   Patient presents with    Flank Pain     left      Review of patient's allergies indicates:   Allergen Reactions    Asa [aspirin]     Nsaids (non-steroidal anti-inflammatory drug)     Tramadol          History of Present Illness     HPI    9/30/2019, 12:56 AM  History obtained from the patient      History of Present Illness: Arsalan Garcia Jr. is a 59 y.o. male patient with a PMHx of necrotic bowel and COPD and an SHx of colostomy placement on 7/3/19 who presents to the Emergency Department for evaluation of R-sided flank pain which onset gradually one day ago. Symptoms are constant and moderate in severity. No mitigating or exacerbating factors reported. Associated sxs include RLQ pain. Patient denies any fever, chills, dysuria, hematuria, urinary frequency, N/V, and all other sxs at this time. No prior Tx reported. No further complaints or concerns at this time.         Arrival mode: EMS     PCP: Provider Notinsystem        Past Medical History:  Past Medical History:   Diagnosis Date    Aorta aneurysm     COPD (chronic obstructive pulmonary disease)     GERD (gastroesophageal reflux disease)     History of necrotic bowel 07/2019    Hypercholesteremia     Peptic ulcer     Shoulder pain        Past Surgical History:  Past Surgical History:   Procedure Laterality Date    BACK SURGERY      COLECTOMY  07/2019    COLON SURGERY      COLOSTOMY  07/2019    LUNG BIOPSY      NECK SURGERY      peptic ulcer surgery           Family History:  History reviewed. No pertinent family history.       Social History:  Social History     Tobacco Use    Smoking status: Current Every Day Smoker     Packs/day: 1.00     Types: Cigarettes    Smokeless tobacco: Never Used   Substance and Sexual Activity    Alcohol use: No    Drug use: No     Sexual activity: Unknown        Review of Systems     Review of Systems   Constitutional: Negative for chills and fever.   HENT: Negative for sore throat.    Respiratory: Negative for shortness of breath.    Cardiovascular: Negative for chest pain.   Gastrointestinal: Negative for nausea and vomiting.   Genitourinary: Positive for flank pain (R side). Negative for dysuria, frequency and hematuria.   Musculoskeletal: Negative for back pain.   Skin: Negative for rash.   Neurological: Negative for weakness.   Hematological: Does not bruise/bleed easily.   All other systems reviewed and are negative.     Physical Exam     Initial Vitals [09/30/19 0042]   BP Pulse Resp Temp SpO2   127/87 93 16 98.4 °F (36.9 °C) 97 %      MAP       --          Physical Exam  Nursing Notes and Vital Signs Reviewed.  Constitutional: Patient is in no acute distress. Well-developed and well-nourished.  Head: Atraumatic. Normocephalic.  Eyes: PERRL. EOM intact. Conjunctivae are not pale. No scleral icterus.  ENT: Mucous membranes are moist. Oropharynx is clear and symmetric.    Neck: Supple. Full ROM. No lymphadenopathy.  Cardiovascular: Regular rate. Regular rhythm. No murmurs, rubs, or gallops. Distal pulses are 2+ and symmetric.  Pulmonary/Chest: No respiratory distress. Clear to auscultation bilaterally. No wheezing or rales.  Abdominal: Soft and non-distended. Pt has coloscopy bag in LLQ with brown colored stool and no blood. Incisions are healing appropriately. There is RLQ tenderness. No erythema.  : No CVA tenderness  Musculoskeletal: Moves all extremities. No obvious deformities. No edema.   Skin: Warm and dry.  Neurological:  Alert, awake, and appropriate.  Normal speech.  No acute focal neurological deficits are appreciated.  Psychiatric: Normal affect. Good eye contact. Appropriate in content.     ED Course   Procedures  ED Vital Signs:  Vitals:    09/30/19 0042 09/30/19 0051 09/30/19 0101 09/30/19 0201   BP: 127/87 (!) 153/93  "136/80 129/80   Pulse: 93 94 90 85   Resp: 16      Temp: 98.4 °F (36.9 °C)      TempSrc: Oral      SpO2: 97% 98% 97% 95%   Weight: 45.4 kg (100 lb)      Height: 5' 6" (1.676 m)       09/30/19 0300   BP: 115/70   Pulse: 88   Resp:    Temp:    TempSrc:    SpO2: 95%   Weight:    Height:        Abnormal Lab Results:  Labs Reviewed   COMPREHENSIVE METABOLIC PANEL - Abnormal; Notable for the following components:       Result Value    Sodium 135 (*)     Calcium 8.3 (*)     Albumin 2.1 (*)     Anion Gap 7 (*)     All other components within normal limits   CBC W/ AUTO DIFFERENTIAL - Abnormal; Notable for the following components:    RBC 3.83 (*)     Hemoglobin 9.2 (*)     Hematocrit 30.5 (*)     Mean Corpuscular Volume 80 (*)     Mean Corpuscular Hemoglobin 24.0 (*)     Mean Corpuscular Hemoglobin Conc 30.2 (*)     RDW 17.3 (*)     Platelets 364 (*)     MPV 8.9 (*)     All other components within normal limits   LIPASE   URINALYSIS, REFLEX TO URINE CULTURE    Narrative:     Preferred Collection Type->Urine, Clean Catch        All Lab Results:  Results for orders placed or performed during the hospital encounter of 09/30/19   Comprehensive metabolic panel   Result Value Ref Range    Sodium 135 (L) 136 - 145 mmol/L    Potassium 4.0 3.5 - 5.1 mmol/L    Chloride 103 95 - 110 mmol/L    CO2 25 23 - 29 mmol/L    Glucose 97 70 - 110 mg/dL    BUN, Bld 18 6 - 20 mg/dL    Creatinine 0.6 0.5 - 1.4 mg/dL    Calcium 8.3 (L) 8.7 - 10.5 mg/dL    Total Protein 7.3 6.0 - 8.4 g/dL    Albumin 2.1 (L) 3.5 - 5.2 g/dL    Total Bilirubin 0.3 0.1 - 1.0 mg/dL    Alkaline Phosphatase 77 55 - 135 U/L    AST 24 10 - 40 U/L    ALT 30 10 - 44 U/L    Anion Gap 7 (L) 8 - 16 mmol/L    eGFR if African American >60 >60 mL/min/1.73 m^2    eGFR if non African American >60 >60 mL/min/1.73 m^2   CBC auto differential   Result Value Ref Range    WBC 7.61 3.90 - 12.70 K/uL    RBC 3.83 (L) 4.60 - 6.20 M/uL    Hemoglobin 9.2 (L) 14.0 - 18.0 g/dL    Hematocrit 30.5 " (L) 40.0 - 54.0 %    Mean Corpuscular Volume 80 (L) 82 - 98 fL    Mean Corpuscular Hemoglobin 24.0 (L) 27.0 - 31.0 pg    Mean Corpuscular Hemoglobin Conc 30.2 (L) 32.0 - 36.0 g/dL    RDW 17.3 (H) 11.5 - 14.5 %    Platelets 364 (H) 150 - 350 K/uL    MPV 8.9 (L) 9.2 - 12.9 fL    Gran # (ANC) 4.5 1.8 - 7.7 K/uL    Lymph # 2.1 1.0 - 4.8 K/uL    Mono # 0.7 0.3 - 1.0 K/uL    Eos # 0.2 0.0 - 0.5 K/uL    Baso # 0.04 0.00 - 0.20 K/uL    Gran% 60.1 38.0 - 73.0 %    Lymph% 27.7 18.0 - 48.0 %    Mono% 9.2 4.0 - 15.0 %    Eosinophil% 2.9 0.0 - 8.0 %    Basophil% 0.5 0.0 - 1.9 %    Differential Method Automated    Lipase   Result Value Ref Range    Lipase 47 4 - 60 U/L   Urinalysis, Reflex to Urine Culture Urine, Clean Catch   Result Value Ref Range    Specimen UA Urine, Clean Catch     Color, UA Yellow Yellow, Straw, Rebekah    Appearance, UA Clear Clear    pH, UA 7.0 5.0 - 8.0    Specific Gravity, UA 1.015 1.005 - 1.030    Protein, UA Negative Negative    Glucose, UA Negative Negative    Ketones, UA Negative Negative    Bilirubin (UA) Negative Negative    Occult Blood UA Negative Negative    Nitrite, UA Negative Negative    Urobilinogen, UA Negative <2.0 EU/dL    Leukocytes, UA Negative Negative         Imaging Results:  Imaging Results         3:08 AM: Per STAT radiology, pt's CT Abdomen and Pelvis with Intravenous contrast results: Centrilobular emphysema with reticular and ground glass opacities within the right lower lobe which may represent scarring. An infectious process is not excluded. Postsurgical changes within the colon with a left upper quadrant colostomy. Wall thickening pf the small bowel which may represent a nonspecific enteritis. Small amount ascites seen within the upper abdomen.              The Emergency Provider reviewed the vital signs and test results, which are outlined above.     ED Discussion       3:18 AM: Reassessed pt at this time.Discussed with pt all pertinent ED information and results. Discussed  pt dx and plan of tx. Pt is to follow up with his surgeon on 10/2/19. Gave pt all f/u and return to the ED instructions. All questions and concerns were addressed at this time. Pt expresses understanding of information and instructions, and is comfortable with plan to discharge. Pt is stable for discharge.    I discussed with patient and/or family/caretaker that evaluation in the ED does not suggest any emergent or life threatening medical conditions requiring immediate intervention beyond what was provided in the ED, and I believe patient is safe for discharge.  Regardless, an unremarkable evaluation in the ED does not preclude the development or presence of a serious of life threatening condition. As such, patient was instructed to return immediately for any worsening or change in current symptoms.           Medical Decision Making:   Clinical Tests:   Lab Tests: Ordered and Reviewed  Radiological Study: Ordered and Reviewed  59-year-old male having pain 3 months after surgery; patient has upcoming appointment and 2 days with his surgeon; ED workup shows no emergent etiology; pain treated in the emergency department and discharged home follow-up with surgeon           ED Medication(s):  Medications   HYDROcodone-acetaminophen 5-325 mg per tablet 1 tablet (has no administration in time range)   sodium chloride 0.9% bolus 1,000 mL (1,000 mLs Intravenous New Bag 9/30/19 0132)   ondansetron injection 4 mg (4 mg Intravenous Given 9/30/19 0132)   morphine injection 4 mg (4 mg Intravenous Given 9/30/19 0133)   iohexol (OMNIPAQUE 350) injection 100 mL (75 mLs Intravenous Given 9/30/19 0147)       New Prescriptions    No medications on file        Medication List      ASK your doctor about these medications    albuterol 90 mcg/actuation inhaler  Commonly known as:  PROVENTIL/VENTOLIN HFA     atorvastatin 20 MG tablet  Commonly known as:  LIPITOR     gabapentin 100 MG capsule  Commonly known as:  NEURONTIN     pantoprazole  40 MG tablet  Commonly known as:  PROTONIX     spironolactone 50 MG tablet  Commonly known as:  ALDACTONE  Take 1 tablet (50 mg total) by mouth once daily. For fluid retention     tamsulosin 0.4 mg Cap  Commonly known as:  FLOMAX     thiamine mononitrate (vit B1) 100 mg Tab  Commonly known as:  VITAMIN B-1 (MONONITRATE)     umeclidinium-vilanterol 62.5-25 mcg/actuation Dsdv  Commonly known as:  ANORO ELLIPTA            Follow-up Information     Follow-up with surgeon this week as scheduled.                     Scribe Attestation:   Scribe #1: I performed the above scribed service and the documentation accurately describes the services I performed. I attest to the accuracy of the note.     Attending:   Physician Attestation Statement for Scribe #1: I, Arturo Ch MD, personally performed the services described in this documentation, as scribed by Gregory Olson/Jennifer Sebastian, in my presence, and it is both accurate and complete.           Clinical Impression       ICD-10-CM ICD-9-CM   1. Right lower quadrant abdominal pain R10.31 789.03       Disposition:   Disposition: Discharged  Condition: Stable         Arturo Ch MD  09/30/19 0322

## 2019-10-10 ENCOUNTER — HOSPITAL ENCOUNTER (OUTPATIENT)
Facility: HOSPITAL | Age: 59
Discharge: LEFT BEFORE DISCHARGE | End: 2019-10-11
Attending: EMERGENCY MEDICINE | Admitting: INTERNAL MEDICINE
Payer: MEDICAID

## 2019-10-10 DIAGNOSIS — R10.30 LOWER ABDOMINAL PAIN: Primary | ICD-10-CM

## 2019-10-10 PROBLEM — K59.00 CONSTIPATION: Status: ACTIVE | Noted: 2019-10-10

## 2019-10-10 LAB
ALBUMIN SERPL BCP-MCNC: 2.2 G/DL (ref 3.5–5.2)
ALP SERPL-CCNC: 66 U/L (ref 55–135)
ALT SERPL W/O P-5'-P-CCNC: 20 U/L (ref 10–44)
AMYLASE SERPL-CCNC: 96 U/L (ref 20–110)
ANION GAP SERPL CALC-SCNC: 8 MMOL/L (ref 8–16)
AST SERPL-CCNC: 17 U/L (ref 10–40)
BASOPHILS # BLD AUTO: 0.06 K/UL (ref 0–0.2)
BASOPHILS NFR BLD: 0.6 % (ref 0–1.9)
BILIRUB SERPL-MCNC: 0.3 MG/DL (ref 0.1–1)
BILIRUB UR QL STRIP: NEGATIVE
BUN SERPL-MCNC: 14 MG/DL (ref 6–20)
CALCIUM SERPL-MCNC: 8.5 MG/DL (ref 8.7–10.5)
CHLORIDE SERPL-SCNC: 102 MMOL/L (ref 95–110)
CLARITY UR: CLEAR
CO2 SERPL-SCNC: 26 MMOL/L (ref 23–29)
COLOR UR: YELLOW
CREAT SERPL-MCNC: 0.6 MG/DL (ref 0.5–1.4)
DIFFERENTIAL METHOD: ABNORMAL
EOSINOPHIL # BLD AUTO: 0.3 K/UL (ref 0–0.5)
EOSINOPHIL NFR BLD: 3.1 % (ref 0–8)
ERYTHROCYTE [DISTWIDTH] IN BLOOD BY AUTOMATED COUNT: 17.1 % (ref 11.5–14.5)
EST. GFR  (AFRICAN AMERICAN): >60 ML/MIN/1.73 M^2
EST. GFR  (NON AFRICAN AMERICAN): >60 ML/MIN/1.73 M^2
GLUCOSE SERPL-MCNC: 81 MG/DL (ref 70–110)
GLUCOSE UR QL STRIP: NEGATIVE
HCT VFR BLD AUTO: 29.8 % (ref 40–54)
HGB BLD-MCNC: 8.6 G/DL (ref 14–18)
HGB UR QL STRIP: NEGATIVE
IMM GRANULOCYTES # BLD AUTO: 0.09 K/UL (ref 0–0.04)
IMM GRANULOCYTES NFR BLD AUTO: 0.9 % (ref 0–0.5)
KETONES UR QL STRIP: NEGATIVE
LEUKOCYTE ESTERASE UR QL STRIP: NEGATIVE
LIPASE SERPL-CCNC: 34 U/L (ref 4–60)
LYMPHOCYTES # BLD AUTO: 3 K/UL (ref 1–4.8)
LYMPHOCYTES NFR BLD: 31 % (ref 18–48)
MCH RBC QN AUTO: 22.8 PG (ref 27–31)
MCHC RBC AUTO-ENTMCNC: 28.9 G/DL (ref 32–36)
MCV RBC AUTO: 79 FL (ref 82–98)
MONOCYTES # BLD AUTO: 0.8 K/UL (ref 0.3–1)
MONOCYTES NFR BLD: 8.5 % (ref 4–15)
NEUTROPHILS # BLD AUTO: 5.5 K/UL (ref 1.8–7.7)
NEUTROPHILS NFR BLD: 55.9 % (ref 38–73)
NITRITE UR QL STRIP: NEGATIVE
NRBC BLD-RTO: 0 /100 WBC
PH UR STRIP: 8 [PH] (ref 5–8)
PLATELET # BLD AUTO: 470 K/UL (ref 150–350)
PMV BLD AUTO: 9.7 FL (ref 9.2–12.9)
POTASSIUM SERPL-SCNC: 4 MMOL/L (ref 3.5–5.1)
PROT SERPL-MCNC: 7.3 G/DL (ref 6–8.4)
PROT UR QL STRIP: NEGATIVE
RBC # BLD AUTO: 3.78 M/UL (ref 4.6–6.2)
SODIUM SERPL-SCNC: 136 MMOL/L (ref 136–145)
SP GR UR STRIP: 1.01 (ref 1–1.03)
URN SPEC COLLECT METH UR: NORMAL
UROBILINOGEN UR STRIP-ACNC: NEGATIVE EU/DL
WBC # BLD AUTO: 9.78 K/UL (ref 3.9–12.7)

## 2019-10-10 PROCEDURE — 85025 COMPLETE CBC W/AUTO DIFF WBC: CPT

## 2019-10-10 PROCEDURE — 96361 HYDRATE IV INFUSION ADD-ON: CPT

## 2019-10-10 PROCEDURE — 99285 EMERGENCY DEPT VISIT HI MDM: CPT | Mod: 25

## 2019-10-10 PROCEDURE — 82150 ASSAY OF AMYLASE: CPT

## 2019-10-10 PROCEDURE — G0378 HOSPITAL OBSERVATION PER HR: HCPCS

## 2019-10-10 PROCEDURE — 96375 TX/PRO/DX INJ NEW DRUG ADDON: CPT

## 2019-10-10 PROCEDURE — 81003 URINALYSIS AUTO W/O SCOPE: CPT

## 2019-10-10 PROCEDURE — 25500020 PHARM REV CODE 255: Performed by: EMERGENCY MEDICINE

## 2019-10-10 PROCEDURE — 80053 COMPREHEN METABOLIC PANEL: CPT

## 2019-10-10 PROCEDURE — 96376 TX/PRO/DX INJ SAME DRUG ADON: CPT

## 2019-10-10 PROCEDURE — 83690 ASSAY OF LIPASE: CPT

## 2019-10-10 PROCEDURE — 63600175 PHARM REV CODE 636 W HCPCS: Performed by: EMERGENCY MEDICINE

## 2019-10-10 PROCEDURE — 96374 THER/PROPH/DIAG INJ IV PUSH: CPT | Mod: 59

## 2019-10-10 RX ORDER — ONDANSETRON 2 MG/ML
4 INJECTION INTRAMUSCULAR; INTRAVENOUS
Status: COMPLETED | OUTPATIENT
Start: 2019-10-10 | End: 2019-10-10

## 2019-10-10 RX ORDER — POTASSIUM CHLORIDE 20 MEQ/15ML
40 SOLUTION ORAL
Status: DISCONTINUED | OUTPATIENT
Start: 2019-10-11 | End: 2019-10-11 | Stop reason: HOSPADM

## 2019-10-10 RX ORDER — SODIUM CHLORIDE 0.9 % (FLUSH) 0.9 %
10 SYRINGE (ML) INJECTION
Status: DISCONTINUED | OUTPATIENT
Start: 2019-10-11 | End: 2019-10-11 | Stop reason: HOSPADM

## 2019-10-10 RX ORDER — ACETAMINOPHEN 325 MG/1
650 TABLET ORAL EVERY 8 HOURS PRN
Status: DISCONTINUED | OUTPATIENT
Start: 2019-10-11 | End: 2019-10-11 | Stop reason: HOSPADM

## 2019-10-10 RX ORDER — LANOLIN ALCOHOL/MO/W.PET/CERES
800 CREAM (GRAM) TOPICAL
Status: DISCONTINUED | OUTPATIENT
Start: 2019-10-11 | End: 2019-10-11 | Stop reason: HOSPADM

## 2019-10-10 RX ORDER — HYDROMORPHONE HYDROCHLORIDE 2 MG/ML
1 INJECTION, SOLUTION INTRAMUSCULAR; INTRAVENOUS; SUBCUTANEOUS
Status: COMPLETED | OUTPATIENT
Start: 2019-10-10 | End: 2019-10-10

## 2019-10-10 RX ORDER — POLYETHYLENE GLYCOL 3350 17 G/17G
17 POWDER, FOR SOLUTION ORAL DAILY
Status: DISCONTINUED | OUTPATIENT
Start: 2019-10-11 | End: 2019-10-11 | Stop reason: HOSPADM

## 2019-10-10 RX ORDER — ACETAMINOPHEN 325 MG/1
650 TABLET ORAL EVERY 4 HOURS PRN
Status: DISCONTINUED | OUTPATIENT
Start: 2019-10-11 | End: 2019-10-11 | Stop reason: HOSPADM

## 2019-10-10 RX ORDER — ARFORMOTEROL TARTRATE 15 UG/2ML
15 SOLUTION RESPIRATORY (INHALATION) 2 TIMES DAILY
Status: DISCONTINUED | OUTPATIENT
Start: 2019-10-11 | End: 2019-10-11 | Stop reason: HOSPADM

## 2019-10-10 RX ORDER — ONDANSETRON 2 MG/ML
4 INJECTION INTRAMUSCULAR; INTRAVENOUS EVERY 8 HOURS PRN
Status: DISCONTINUED | OUTPATIENT
Start: 2019-10-11 | End: 2019-10-11 | Stop reason: HOSPADM

## 2019-10-10 RX ORDER — TAMSULOSIN HYDROCHLORIDE 0.4 MG/1
0.4 CAPSULE ORAL DAILY
Status: DISCONTINUED | OUTPATIENT
Start: 2019-10-11 | End: 2019-10-11 | Stop reason: HOSPADM

## 2019-10-10 RX ORDER — GABAPENTIN 100 MG/1
200 CAPSULE ORAL 3 TIMES DAILY
Status: DISCONTINUED | OUTPATIENT
Start: 2019-10-11 | End: 2019-10-11 | Stop reason: HOSPADM

## 2019-10-10 RX ORDER — ALBUTEROL SULFATE 0.83 MG/ML
2.5 SOLUTION RESPIRATORY (INHALATION) EVERY 6 HOURS PRN
Status: DISCONTINUED | OUTPATIENT
Start: 2019-10-11 | End: 2019-10-11 | Stop reason: HOSPADM

## 2019-10-10 RX ORDER — ATORVASTATIN CALCIUM 10 MG/1
20 TABLET, FILM COATED ORAL NIGHTLY
Status: DISCONTINUED | OUTPATIENT
Start: 2019-10-11 | End: 2019-10-11 | Stop reason: HOSPADM

## 2019-10-10 RX ORDER — THIAMINE HCL 100 MG
100 TABLET ORAL DAILY
Status: DISCONTINUED | OUTPATIENT
Start: 2019-10-11 | End: 2019-10-11 | Stop reason: HOSPADM

## 2019-10-10 RX ORDER — SODIUM,POTASSIUM PHOSPHATES 280-250MG
2 POWDER IN PACKET (EA) ORAL
Status: DISCONTINUED | OUTPATIENT
Start: 2019-10-11 | End: 2019-10-11 | Stop reason: HOSPADM

## 2019-10-10 RX ORDER — ONDANSETRON 8 MG/1
8 TABLET, ORALLY DISINTEGRATING ORAL EVERY 8 HOURS PRN
Status: DISCONTINUED | OUTPATIENT
Start: 2019-10-11 | End: 2019-10-11 | Stop reason: HOSPADM

## 2019-10-10 RX ORDER — PANTOPRAZOLE SODIUM 40 MG/1
40 TABLET, DELAYED RELEASE ORAL DAILY
Status: DISCONTINUED | OUTPATIENT
Start: 2019-10-11 | End: 2019-10-11 | Stop reason: HOSPADM

## 2019-10-10 RX ORDER — SPIRONOLACTONE 25 MG/1
50 TABLET ORAL DAILY
Status: DISCONTINUED | OUTPATIENT
Start: 2019-10-11 | End: 2019-10-11 | Stop reason: HOSPADM

## 2019-10-10 RX ORDER — IPRATROPIUM BROMIDE 0.5 MG/2.5ML
0.5 SOLUTION RESPIRATORY (INHALATION) EVERY 6 HOURS
Status: DISCONTINUED | OUTPATIENT
Start: 2019-10-11 | End: 2019-10-11 | Stop reason: HOSPADM

## 2019-10-10 RX ADMIN — ONDANSETRON 4 MG: 2 INJECTION INTRAMUSCULAR; INTRAVENOUS at 06:10

## 2019-10-10 RX ADMIN — HYDROMORPHONE HYDROCHLORIDE 1 MG: 2 INJECTION, SOLUTION INTRAMUSCULAR; INTRAVENOUS; SUBCUTANEOUS at 07:10

## 2019-10-10 RX ADMIN — IOHEXOL 75 ML: 350 INJECTION, SOLUTION INTRAVENOUS at 08:10

## 2019-10-10 RX ADMIN — SODIUM CHLORIDE 1000 ML: 0.9 INJECTION, SOLUTION INTRAVENOUS at 07:10

## 2019-10-10 RX ADMIN — HYDROMORPHONE HYDROCHLORIDE 1 MG: 2 INJECTION, SOLUTION INTRAMUSCULAR; INTRAVENOUS; SUBCUTANEOUS at 08:10

## 2019-10-10 NOTE — ED PROVIDER NOTES
SCRIBE #1 NOTE: I, Tiera José, am scribing for, and in the presence of, Darren Mayers Jr., MD. I have scribed the entire note.       History     Chief Complaint   Patient presents with    Abdominal Pain     colostomy patient     Review of patient's allergies indicates:   Allergen Reactions    Asa [aspirin]     Nsaids (non-steroidal anti-inflammatory drug)     Tramadol          History of Present Illness     HPI    10/10/2019, 6:22 PM  History obtained from the patient      History of Present Illness: Asralan Garcia Jr. is a 59 y.o. male patient with a PMHx of COPD, hypercholesteremia, peptic ulcer, GERD and colectomy who presents to the Emergency Department for evaluation of abdominal pain surrounding colostomy which onset gradually this morning at 9 am. Pt reports normal output from colostomy bag. He notes he had colostomy bag placed 4 months ago due to obstruction. Symptoms are constant and moderate in severity. No mitigating or exacerbating factors reported. No associated sxs included. Patient denies any fever, chills, hematochezia, n/v/d, dysuria, urinary frequency/urgency, hematuria, dizziness, extremity weakness/numbness, and all other sxs at this time. No prior Tx. No further complaints or concerns at this time.       Arrival mode: Personal vehicle    PCP: [facility]        Past Medical History:  Past Medical History:   Diagnosis Date    Aorta aneurysm     COPD (chronic obstructive pulmonary disease)     GERD (gastroesophageal reflux disease)     History of necrotic bowel 07/2019    Hypercholesteremia     Hypertension     Peptic ulcer     Shoulder pain        Past Surgical History:  Past Surgical History:   Procedure Laterality Date    BACK SURGERY      COLECTOMY  07/2019    COLON SURGERY      COLOSTOMY  07/2019    LUNG BIOPSY      NECK SURGERY      peptic ulcer surgery           Family History:  History reviewed. No pertinent history.     Social History:  Social  History     Tobacco Use    Smoking status: Current Every Day Smoker     Packs/day: 1.00     Types: Cigarettes    Smokeless tobacco: Never Used   Substance and Sexual Activity    Alcohol use: No    Drug use: No    Sexual activity: Unknown        Review of Systems     Review of Systems   Constitutional: Negative for chills and fever.   HENT: Negative for rhinorrhea and sore throat.    Respiratory: Negative for cough and shortness of breath.    Cardiovascular: Negative for chest pain and leg swelling.   Gastrointestinal: Positive for abdominal pain (surrounding colostomy bag). Negative for blood in stool, diarrhea, nausea and vomiting.   Genitourinary: Negative for dysuria, frequency, hematuria and urgency.   Musculoskeletal: Negative for back pain.   Skin: Negative for rash.   Neurological: Negative for dizziness, weakness and numbness.   All other systems reviewed and are negative.       Physical Exam     Initial Vitals [10/10/19 1816]   BP Pulse Resp Temp SpO2   (!) 149/97 89 20 98.2 °F (36.8 °C) 97 %      MAP       --          Physical Exam  Nursing Notes and Vital Signs Reviewed.  Constitutional: Patient is in no acute distress. Well-developed and well-nourished.  Head: Atraumatic. Normocephalic.  Eyes: PERRL. EOM intact. Conjunctivae are not pale. No scleral icterus.  ENT: Mucous membranes are moist. Oropharynx is clear and symmetric.    Neck: Supple. Full ROM. No lymphadenopathy.  Cardiovascular: Regular rate. Regular rhythm. No murmurs, rubs, or gallops. Distal pulses are 2+ and symmetric.  Pulmonary/Chest: No respiratory distress. Clear to auscultation bilaterally. No wheezing or rales.  Abdominal:  Patient has tenderness to the bilateral lower quadrants as well as the mid left abdomen.  There is voluntary guarding on palpation of these areas.  There are no peritoneal signs and no rebound.  Patient has colostomy in the left mid abdomen with brown stool.  The colostomy is draining appropriately.  There is  "no blood in the stool.  There is no palpable pulsatile abdominal mass.  Musculoskeletal: Moves all extremities. No obvious deformities. No edema.  Skin: Warm and dry. Negative Cohen, Luong JohnsonIrwin's sign on exam  Neurological:  Alert, awake, and appropriate.  Normal speech.  No acute focal neurological deficits are appreciated.  Psychiatric: Normal affect. Good eye contact. Appropriate in content.     ED Course   Procedures  ED Vital Signs:  Vitals:    10/10/19 1816 10/10/19 1935   BP: (!) 149/97 136/81   Pulse: 89 81   Resp: 20 16   Temp: 98.2 °F (36.8 °C) 98.3 °F (36.8 °C)   TempSrc: Oral Oral   SpO2: 97% (!) 92%   Height: 5' 6" (1.676 m)        Abnormal Lab Results:  Labs Reviewed   CBC W/ AUTO DIFFERENTIAL - Abnormal; Notable for the following components:       Result Value    RBC 3.78 (*)     Hemoglobin 8.6 (*)     Hematocrit 29.8 (*)     Mean Corpuscular Volume 79 (*)     Mean Corpuscular Hemoglobin 22.8 (*)     Mean Corpuscular Hemoglobin Conc 28.9 (*)     RDW 17.1 (*)     Platelets 470 (*)     Immature Granulocytes 0.9 (*)     Immature Grans (Abs) 0.09 (*)     All other components within normal limits   COMPREHENSIVE METABOLIC PANEL - Abnormal; Notable for the following components:    Calcium 8.5 (*)     Albumin 2.2 (*)     All other components within normal limits   AMYLASE   LIPASE   URINALYSIS        All Lab Results:  Results for orders placed or performed during the hospital encounter of 10/10/19   CBC auto differential   Result Value Ref Range    WBC 9.78 3.90 - 12.70 K/uL    RBC 3.78 (L) 4.60 - 6.20 M/uL    Hemoglobin 8.6 (L) 14.0 - 18.0 g/dL    Hematocrit 29.8 (L) 40.0 - 54.0 %    Mean Corpuscular Volume 79 (L) 82 - 98 fL    Mean Corpuscular Hemoglobin 22.8 (L) 27.0 - 31.0 pg    Mean Corpuscular Hemoglobin Conc 28.9 (L) 32.0 - 36.0 g/dL    RDW 17.1 (H) 11.5 - 14.5 %    Platelets 470 (H) 150 - 350 K/uL    MPV 9.7 9.2 - 12.9 fL    Immature Granulocytes 0.9 (H) 0.0 - 0.5 %    Gran # (ANC) 5.5 1.8 - " 7.7 K/uL    Immature Grans (Abs) 0.09 (H) 0.00 - 0.04 K/uL    Lymph # 3.0 1.0 - 4.8 K/uL    Mono # 0.8 0.3 - 1.0 K/uL    Eos # 0.3 0.0 - 0.5 K/uL    Baso # 0.06 0.00 - 0.20 K/uL    nRBC 0 0 /100 WBC    Gran% 55.9 38.0 - 73.0 %    Lymph% 31.0 18.0 - 48.0 %    Mono% 8.5 4.0 - 15.0 %    Eosinophil% 3.1 0.0 - 8.0 %    Basophil% 0.6 0.0 - 1.9 %    Differential Method Automated    Comprehensive metabolic panel   Result Value Ref Range    Sodium 136 136 - 145 mmol/L    Potassium 4.0 3.5 - 5.1 mmol/L    Chloride 102 95 - 110 mmol/L    CO2 26 23 - 29 mmol/L    Glucose 81 70 - 110 mg/dL    BUN, Bld 14 6 - 20 mg/dL    Creatinine 0.6 0.5 - 1.4 mg/dL    Calcium 8.5 (L) 8.7 - 10.5 mg/dL    Total Protein 7.3 6.0 - 8.4 g/dL    Albumin 2.2 (L) 3.5 - 5.2 g/dL    Total Bilirubin 0.3 0.1 - 1.0 mg/dL    Alkaline Phosphatase 66 55 - 135 U/L    AST 17 10 - 40 U/L    ALT 20 10 - 44 U/L    Anion Gap 8 8 - 16 mmol/L    eGFR if African American >60 >60 mL/min/1.73 m^2    eGFR if non African American >60 >60 mL/min/1.73 m^2   Amylase   Result Value Ref Range    Amylase 96 20 - 110 U/L   Lipase   Result Value Ref Range    Lipase 34 4 - 60 U/L   Urinalysis   Result Value Ref Range    Specimen UA Urine, Clean Catch     Color, UA Yellow Yellow, Straw, Rebekah    Appearance, UA Clear Clear    pH, UA 8.0 5.0 - 8.0    Specific Gravity, UA 1.010 1.005 - 1.030    Protein, UA Negative Negative    Glucose, UA Negative Negative    Ketones, UA Negative Negative    Bilirubin (UA) Negative Negative    Occult Blood UA Negative Negative    Nitrite, UA Negative Negative    Urobilinogen, UA Negative <2.0 EU/dL    Leukocytes, UA Negative Negative       Imaging Results:  Imaging Results          CT Abdomen Pelvis With Contrast (Final result)  Result time 10/10/19 21:08:02    Final result by Arturo Thornton MD (10/10/19 21:08:02)                 Impression:      Diffuse mesenteric edema is suspected.    Moderate constipation.    All CT scans at this facility use  dose modulation, iterative reconstruction, and/or weight based dosing when appropriate to reduce radiation dose to as low as reasonable achievable.      Electronically signed by: Arturo Thornton MD  Date:    10/10/2019  Time:    21:08             Narrative:    EXAMINATION:  CT ABDOMEN PELVIS WITH CONTRAST    CLINICAL HISTORY:  Abd pain, fever, abscess suspected;    TECHNIQUE:  Low dose axial images, sagittal and coronal reformations were obtained from the lung bases to the pubic symphysis following the IV administration of 75 mL of Omnipaque 350.  Oral contrast was not administered.    COMPARISON:  09/30/2019    FINDINGS:  Heart: Normal size. No effusion.    Lung Bases: Clear.    Liver: Normal size and attenuation.  Scattered cysts are seen within the liver.  Small amount of perihepatic ascites noted.  Mild intrahepatic ductal dilatation; correlate with LFTs.  No evidence of choledocholithiasis.    Gallbladder: Mild gallbladder wall thickening which can be seen with intrinsic liver disease.    Bile Ducts: Mild intrahepatic ductal dilatation.    Pancreas: No mass. No peripancreatic fat stranding.    Spleen: Mild splenomegaly.    Adrenals: Normal.    Kidneys/Ureters: Normal enhancement.  No mass or  hydroureteronephrosis.    Bladder: Moderate bladder distension.    Reproductive organs: Normal.    GI Tract/Mesentery: No evidence of bowel obstruction.  Ostomy is seen within the ventral abdomen.  Moderate constipation.    Mild mesenteric edema is seen.    Peritoneal Space: Scattered minimal ascites.  No free air.  No intra-abdominal abscess is identified.    Retroperitoneum: No significant adenopathy.    Abdominal wall: Mild diffuse anasarca.    Vasculature: No aneurysm.  Severe atherosclerotic disease.  Aorta bifem bypass graft appears patent.    Bones: No acute fracture.  Stable appearance of chronic compression deformity at L5 with mild retrolisthesis measuring 6 mm.  Posterior decompression is seen at L5 no suspicious  lytic or sclerotic lesions.                                     The Emergency Provider reviewed the vital signs and test results, which are outlined above.     ED Discussion     9:35 PM: Discussed pt's case with Dr. Gardner (General Surgery) who states there is no surgical issue.    9:57 PM: Discussed pt's case with Dr. Callahan (GI) who states that is reasonable to admitt to Observation and repeat CBC in the morning.  However  discharging pt may be reasonable if he could follow-up tomorrow with [facility] hepatology. (this is unlikely due to poor patient reliability inability to arrange this eval) and short notice    10:08 PM: Re-evaluated pt. Pt is resting comfortably but has continued bilateral lower abdominal tenderness with guarding.  D/w pt all pertinent results. D/w pt any concerns expressed at this time. Answered all questions. Pt expresses understanding at this time.    10:23 PM: Discussed case with Dr. Rodriguez ([facility]). Dr. Rodriguez agrees with current care and management of pt and accepts admission.   Admitting Service: Hospital Medicine  Admitting Physician: Dr. Rodriguez  Admit to: Obs/Med Surg    10:27 PM: Re-evaluated pt. I have discussed test results, shared treatment plan, and the need for admission with patient and family at bedside. Pt and family express understanding at this time and agree with all information. All questions answered. Pt and family have no further questions or concerns at this time. Pt is ready for admit.           Medical Decision Making:   Clinical Tests:   Lab Tests: Ordered and Reviewed  Radiological Study: Ordered and Reviewed           ED Medication(s):  Medications   sodium chloride 0.9% bolus 1,000 mL (0 mLs Intravenous Stopped 10/10/19 2057)   hydromorphone (PF) injection 1 mg (1 mg Intravenous Given 10/10/19 1900)   ondansetron injection 4 mg (4 mg Intravenous Given 10/10/19 1859)   iohexol (OMNIPAQUE 350) injection 100 mL (75 mLs Intravenous Given 10/10/19 2031)    hydromorphone (PF) injection 1 mg (1 mg Intravenous Given 10/10/19 2056)       New Prescriptions    No medications on file               Scribe Attestation:   Scribe #1: I performed the above scribed service and the documentation accurately describes the services I performed. I attest to the accuracy of the note.     Attending:   Physician Attestation Statement for Scribe #1: I, Darren Mayers Jr., MD, personally performed the services described in this documentation, as scribed by Tiera José, in my presence, and it is both accurate and complete.           Clinical Impression       ICD-10-CM ICD-9-CM   1. Lower abdominal pain R10.30 789.09       Disposition:   Disposition: Placed in Observation  Condition: Stable         Darren Mayers Jr., MD  10/10/19 5605

## 2019-10-11 ENCOUNTER — HOSPITAL ENCOUNTER (EMERGENCY)
Facility: HOSPITAL | Age: 59
Discharge: HOME | End: 2019-10-12
Attending: FAMILY MEDICINE
Payer: MEDICAID

## 2019-10-11 VITALS
DIASTOLIC BLOOD PRESSURE: 90 MMHG | OXYGEN SATURATION: 95 % | SYSTOLIC BLOOD PRESSURE: 139 MMHG | TEMPERATURE: 98 F | HEIGHT: 66 IN | HEART RATE: 74 BPM | BODY MASS INDEX: 16.14 KG/M2 | RESPIRATION RATE: 20 BRPM

## 2019-10-11 DIAGNOSIS — R10.9 ABDOMINAL PAIN, UNSPECIFIED ABDOMINAL LOCATION: Primary | ICD-10-CM

## 2019-10-11 PROBLEM — R10.30 LOWER ABDOMINAL PAIN: Status: RESOLVED | Noted: 2019-10-10 | Resolved: 2019-10-11

## 2019-10-11 PROBLEM — K59.00 CONSTIPATION: Status: RESOLVED | Noted: 2019-10-10 | Resolved: 2019-10-11

## 2019-10-11 LAB
ANION GAP SERPL CALC-SCNC: 6 MMOL/L (ref 8–16)
BASOPHILS # BLD AUTO: 0.07 K/UL (ref 0–0.2)
BASOPHILS NFR BLD: 0.7 % (ref 0–1.9)
BUN SERPL-MCNC: 12 MG/DL (ref 6–20)
CALCIUM SERPL-MCNC: 8.7 MG/DL (ref 8.7–10.5)
CHLORIDE SERPL-SCNC: 102 MMOL/L (ref 95–110)
CO2 SERPL-SCNC: 28 MMOL/L (ref 23–29)
CREAT SERPL-MCNC: 0.6 MG/DL (ref 0.5–1.4)
DIFFERENTIAL METHOD: ABNORMAL
EOSINOPHIL # BLD AUTO: 0.4 K/UL (ref 0–0.5)
EOSINOPHIL NFR BLD: 3.6 % (ref 0–8)
ERYTHROCYTE [DISTWIDTH] IN BLOOD BY AUTOMATED COUNT: 17.1 % (ref 11.5–14.5)
EST. GFR  (AFRICAN AMERICAN): >60 ML/MIN/1.73 M^2
EST. GFR  (NON AFRICAN AMERICAN): >60 ML/MIN/1.73 M^2
GLUCOSE SERPL-MCNC: 82 MG/DL (ref 70–110)
HCT VFR BLD AUTO: 31.9 % (ref 40–54)
HGB BLD-MCNC: 9.2 G/DL (ref 14–18)
IMM GRANULOCYTES # BLD AUTO: 0.06 K/UL (ref 0–0.04)
IMM GRANULOCYTES NFR BLD AUTO: 0.6 % (ref 0–0.5)
LYMPHOCYTES # BLD AUTO: 2.5 K/UL (ref 1–4.8)
LYMPHOCYTES NFR BLD: 23.9 % (ref 18–48)
MAGNESIUM SERPL-MCNC: 1.8 MG/DL (ref 1.6–2.6)
MCH RBC QN AUTO: 23.1 PG (ref 27–31)
MCHC RBC AUTO-ENTMCNC: 28.8 G/DL (ref 32–36)
MCV RBC AUTO: 80 FL (ref 82–98)
MONOCYTES # BLD AUTO: 0.8 K/UL (ref 0.3–1)
MONOCYTES NFR BLD: 7.9 % (ref 4–15)
NEUTROPHILS # BLD AUTO: 6.6 K/UL (ref 1.8–7.7)
NEUTROPHILS NFR BLD: 63.3 % (ref 38–73)
NRBC BLD-RTO: 0 /100 WBC
PHOSPHATE SERPL-MCNC: 3.9 MG/DL (ref 2.7–4.5)
PLATELET # BLD AUTO: 435 K/UL (ref 150–350)
PMV BLD AUTO: 9.5 FL (ref 9.2–12.9)
POTASSIUM SERPL-SCNC: 4.1 MMOL/L (ref 3.5–5.1)
RBC # BLD AUTO: 3.99 M/UL (ref 4.6–6.2)
SODIUM SERPL-SCNC: 136 MMOL/L (ref 136–145)
WBC # BLD AUTO: 10.45 K/UL (ref 3.9–12.7)

## 2019-10-11 PROCEDURE — 80048 BASIC METABOLIC PNL TOTAL CA: CPT

## 2019-10-11 PROCEDURE — 99283 EMERGENCY DEPT VISIT LOW MDM: CPT

## 2019-10-11 PROCEDURE — G0378 HOSPITAL OBSERVATION PER HR: HCPCS

## 2019-10-11 PROCEDURE — 96376 TX/PRO/DX INJ SAME DRUG ADON: CPT

## 2019-10-11 PROCEDURE — 84100 ASSAY OF PHOSPHORUS: CPT

## 2019-10-11 PROCEDURE — 36415 COLL VENOUS BLD VENIPUNCTURE: CPT

## 2019-10-11 PROCEDURE — 85025 COMPLETE CBC W/AUTO DIFF WBC: CPT

## 2019-10-11 PROCEDURE — 63600175 PHARM REV CODE 636 W HCPCS: Performed by: INTERNAL MEDICINE

## 2019-10-11 PROCEDURE — 36000 PLACE NEEDLE IN VEIN: CPT

## 2019-10-11 PROCEDURE — 83735 ASSAY OF MAGNESIUM: CPT

## 2019-10-11 RX ORDER — HYDROMORPHONE HYDROCHLORIDE 2 MG/ML
1 INJECTION, SOLUTION INTRAMUSCULAR; INTRAVENOUS; SUBCUTANEOUS EVERY 4 HOURS PRN
Status: DISCONTINUED | OUTPATIENT
Start: 2019-10-11 | End: 2019-10-11 | Stop reason: HOSPADM

## 2019-10-11 RX ORDER — IPRATROPIUM BROMIDE AND ALBUTEROL SULFATE 2.5; .5 MG/3ML; MG/3ML
3 SOLUTION RESPIRATORY (INHALATION) EVERY 6 HOURS PRN
Status: DISCONTINUED | OUTPATIENT
Start: 2019-10-11 | End: 2019-10-11 | Stop reason: HOSPADM

## 2019-10-11 RX ORDER — LACTULOSE 10 G/15ML
30 SOLUTION ORAL 3 TIMES DAILY
Status: DISCONTINUED | OUTPATIENT
Start: 2019-10-11 | End: 2019-10-11 | Stop reason: HOSPADM

## 2019-10-11 RX ORDER — CYCLOBENZAPRINE HCL 10 MG
10 TABLET ORAL 3 TIMES DAILY PRN
Qty: 15 TABLET | Refills: 0 | Status: SHIPPED | OUTPATIENT
Start: 2019-10-11 | End: 2019-10-11 | Stop reason: SDUPTHER

## 2019-10-11 RX ORDER — CYCLOBENZAPRINE HCL 10 MG
10 TABLET ORAL 3 TIMES DAILY PRN
Qty: 15 TABLET | Refills: 0 | Status: SHIPPED | OUTPATIENT
Start: 2019-10-11 | End: 2019-10-16

## 2019-10-11 RX ADMIN — HYDROMORPHONE HYDROCHLORIDE 1 MG: 2 INJECTION, SOLUTION INTRAMUSCULAR; INTRAVENOUS; SUBCUTANEOUS at 12:10

## 2019-10-11 RX ADMIN — HYDROMORPHONE HYDROCHLORIDE 1 MG: 2 INJECTION, SOLUTION INTRAMUSCULAR; INTRAVENOUS; SUBCUTANEOUS at 05:10

## 2019-10-11 NOTE — SUBJECTIVE & OBJECTIVE
Past Medical History:   Diagnosis Date    Aorta aneurysm     COPD (chronic obstructive pulmonary disease)     GERD (gastroesophageal reflux disease)     History of necrotic bowel 07/2019    Hypercholesteremia     Hypertension     Peptic ulcer     Shoulder pain        Past Surgical History:   Procedure Laterality Date    BACK SURGERY      COLECTOMY  07/2019    COLON SURGERY      COLOSTOMY  07/2019    LUNG BIOPSY      NECK SURGERY      peptic ulcer surgery         Review of patient's allergies indicates:   Allergen Reactions    Asa [aspirin]     Nsaids (non-steroidal anti-inflammatory drug)     Tramadol        No current facility-administered medications on file prior to encounter.      Current Outpatient Medications on File Prior to Encounter   Medication Sig    albuterol (PROVENTIL/VENTOLIN HFA) 90 mcg/actuation inhaler Inhale 2 puffs into the lungs every 6 (six) hours as needed.    atorvastatin (LIPITOR) 20 MG tablet Take 20 mg by mouth.    gabapentin (NEURONTIN) 100 MG capsule Take 200 mg by mouth 3 (three) times daily.     pantoprazole (PROTONIX) 40 MG tablet Take 40 mg by mouth once daily.     spironolactone (ALDACTONE) 50 MG tablet Take 1 tablet (50 mg total) by mouth once daily. For fluid retention    tamsulosin (FLOMAX) 0.4 mg Cap Take 0.4 mg by mouth once daily.     thiamine mononitrate, vit B1, (VITAMIN B-1, MONONITRATE,) 100 mg Tab Take 100 mg by mouth.    umeclidinium-vilanterol (ANORO ELLIPTA) 62.5-25 mcg/actuation DsDv Inhale 1 puff into the lungs.     Family History     None        Tobacco Use    Smoking status: Current Every Day Smoker     Packs/day: 1.00     Types: Cigarettes    Smokeless tobacco: Never Used   Substance and Sexual Activity    Alcohol use: No    Drug use: No    Sexual activity: Not on file     Review of Systems   Constitutional: Negative for activity change, appetite change, chills and fever.   HENT: Negative for congestion, sinus pressure, sinus pain  and sore throat.    Eyes: Negative for discharge and itching.   Respiratory: Negative for apnea, cough, chest tightness and shortness of breath.    Cardiovascular: Negative for chest pain, palpitations and leg swelling.   Gastrointestinal: Positive for abdominal pain and constipation. Negative for abdominal distention, anal bleeding, blood in stool, nausea, rectal pain and vomiting.   Endocrine: Negative for cold intolerance, heat intolerance and polyuria.   Genitourinary: Negative for difficulty urinating.   Musculoskeletal: Negative for arthralgias, back pain and gait problem.   Skin: Negative for color change.   Allergic/Immunologic: Negative for environmental allergies and food allergies.   Neurological: Negative for dizziness, weakness and light-headedness.   Hematological: Negative for adenopathy. Does not bruise/bleed easily.   Psychiatric/Behavioral: Negative for agitation, behavioral problems, self-injury and suicidal ideas.     Objective:     Vital Signs (Most Recent):  Temp: 98.5 °F (36.9 °C) (10/10/19 2200)  Pulse: 79 (10/10/19 2200)  Resp: 18 (10/10/19 2200)  BP: 135/85 (10/10/19 2200)  SpO2: (!) 92 % (10/10/19 2200) Vital Signs (24h Range):  Temp:  [98.2 °F (36.8 °C)-98.5 °F (36.9 °C)] 98.5 °F (36.9 °C)  Pulse:  [79-89] 79  Resp:  [16-20] 18  SpO2:  [92 %-97 %] 92 %  BP: (135-149)/(81-97) 135/85        Body mass index is 16.14 kg/m².    Physical Exam   Constitutional: He is oriented to person, place, and time. He appears well-developed and well-nourished.   HENT:   Head: Normocephalic and atraumatic.   Eyes: Pupils are equal, round, and reactive to light. EOM are normal.   Neck: Normal range of motion. Neck supple.   Cardiovascular: Normal rate and regular rhythm.   Pulmonary/Chest: Effort normal and breath sounds normal. No stridor. No respiratory distress. He has no rales.   Abdominal: He exhibits no distension and no mass. There is tenderness. There is guarding.   Firm, muscular; ostomy in place  left sided; soft brown stool evident;  Well healed midline scar   Genitourinary:   Genitourinary Comments: deferred   Musculoskeletal: Normal range of motion. He exhibits no edema or deformity.   Neurological: He is alert and oriented to person, place, and time.   Skin: Skin is warm and dry. No rash noted. No erythema.   Psychiatric: He has a normal mood and affect. His behavior is normal. Judgment and thought content normal.         CRANIAL NERVES     CN III, IV, VI   Pupils are equal, round, and reactive to light.  Extraocular motions are normal.        Significant Labs:   BMP:   Recent Labs   Lab 10/10/19  1921   GLU 81      K 4.0      CO2 26   BUN 14   CREATININE 0.6   CALCIUM 8.5*     CBC:   Recent Labs   Lab 10/10/19  1921   WBC 9.78   HGB 8.6*   HCT 29.8*   *     CMP:   Recent Labs   Lab 10/10/19  1921      K 4.0      CO2 26   GLU 81   BUN 14   CREATININE 0.6   CALCIUM 8.5*   PROT 7.3   ALBUMIN 2.2*   BILITOT 0.3   ALKPHOS 66   AST 17   ALT 20   ANIONGAP 8   EGFRNONAA >60     Lactic Acid: No results for input(s): LACTATE in the last 48 hours.  Magnesium: No results for input(s): MG in the last 48 hours.  Troponin: No results for input(s): TROPONINI in the last 48 hours.    Significant Imaging:   Imaging Results          CT Abdomen Pelvis With Contrast (Final result)  Result time 10/10/19 21:08:02    Final result by Arturo Thornton MD (10/10/19 21:08:02)                 Impression:      Diffuse mesenteric edema is suspected.    Moderate constipation.    All CT scans at this facility use dose modulation, iterative reconstruction, and/or weight based dosing when appropriate to reduce radiation dose to as low as reasonable achievable.      Electronically signed by: Arturo Thornton MD  Date:    10/10/2019  Time:    21:08             Narrative:    EXAMINATION:  CT ABDOMEN PELVIS WITH CONTRAST    CLINICAL HISTORY:  Abd pain, fever, abscess suspected;    TECHNIQUE:  Low dose axial  images, sagittal and coronal reformations were obtained from the lung bases to the pubic symphysis following the IV administration of 75 mL of Omnipaque 350.  Oral contrast was not administered.    COMPARISON:  09/30/2019    FINDINGS:  Heart: Normal size. No effusion.    Lung Bases: Clear.    Liver: Normal size and attenuation.  Scattered cysts are seen within the liver.  Small amount of perihepatic ascites noted.  Mild intrahepatic ductal dilatation; correlate with LFTs.  No evidence of choledocholithiasis.    Gallbladder: Mild gallbladder wall thickening which can be seen with intrinsic liver disease.    Bile Ducts: Mild intrahepatic ductal dilatation.    Pancreas: No mass. No peripancreatic fat stranding.    Spleen: Mild splenomegaly.    Adrenals: Normal.    Kidneys/Ureters: Normal enhancement.  No mass or  hydroureteronephrosis.    Bladder: Moderate bladder distension.    Reproductive organs: Normal.    GI Tract/Mesentery: No evidence of bowel obstruction.  Ostomy is seen within the ventral abdomen.  Moderate constipation.    Mild mesenteric edema is seen.    Peritoneal Space: Scattered minimal ascites.  No free air.  No intra-abdominal abscess is identified.    Retroperitoneum: No significant adenopathy.    Abdominal wall: Mild diffuse anasarca.    Vasculature: No aneurysm.  Severe atherosclerotic disease.  Aorta bifem bypass graft appears patent.    Bones: No acute fracture.  Stable appearance of chronic compression deformity at L5 with mild retrolisthesis measuring 6 mm.  Posterior decompression is seen at L5 no suspicious lytic or sclerotic lesions.

## 2019-10-11 NOTE — H&P
[facility] Hospital Medicine  History & Physical    Patient Name: Arsalan Garcia Jr.  MRN: [id number]  Admission Date: 10/10/2019  Attending Physician: Carlos Esquivel  Primary Care Provider: Provider Notinsystem         Patient information was obtained from patient and ER records.     Subjective:     Principal Problem:Lower abdominal pain    Chief Complaint:   Chief Complaint   Patient presents with    Abdominal Pain     colostomy patient        HPI: 58 YO WM presenting to the ED by way of EMS, complaining of lower abdominal pain, described as 10/10; gradual onset, with bouts occurring over the past several weeks;   Patient has history of ischemic colitis and is S/P sigmoid resection with end colostomy creation 7/3/19 at [facility].  He states he has presented there with like complaints but was discharged each time;  He denies N/V, theree is soft stool in the colostomy bag;  An abdominal CT scan tonight shows   diffuse mesenteric edema is suspected, with moderate constipation;  Patient prior history of heroin abuse, but denies recent use;  In the ED general surgery was consulted but did not feel this was a surgical issue;  GI agreed to see the patine in consultation and advised admission for pain control;    Past Medical History:   Diagnosis Date    Aorta aneurysm     COPD (chronic obstructive pulmonary disease)     GERD (gastroesophageal reflux disease)     History of necrotic bowel 07/2019    Hypercholesteremia     Hypertension     Peptic ulcer     Shoulder pain        Past Surgical History:   Procedure Laterality Date    BACK SURGERY      COLECTOMY  07/2019    COLON SURGERY      COLOSTOMY  07/2019    LUNG BIOPSY      NECK SURGERY      peptic ulcer surgery         Review of patient's allergies indicates:   Allergen Reactions    Asa [aspirin]     Nsaids (non-steroidal anti-inflammatory drug)     Tramadol        No current facility-administered medications on file prior to encounter.       Current Outpatient Medications on File Prior to Encounter   Medication Sig    albuterol (PROVENTIL/VENTOLIN HFA) 90 mcg/actuation inhaler Inhale 2 puffs into the lungs every 6 (six) hours as needed.    atorvastatin (LIPITOR) 20 MG tablet Take 20 mg by mouth.    gabapentin (NEURONTIN) 100 MG capsule Take 200 mg by mouth 3 (three) times daily.     pantoprazole (PROTONIX) 40 MG tablet Take 40 mg by mouth once daily.     spironolactone (ALDACTONE) 50 MG tablet Take 1 tablet (50 mg total) by mouth once daily. For fluid retention    tamsulosin (FLOMAX) 0.4 mg Cap Take 0.4 mg by mouth once daily.     thiamine mononitrate, vit B1, (VITAMIN B-1, MONONITRATE,) 100 mg Tab Take 100 mg by mouth.    umeclidinium-vilanterol (ANORO ELLIPTA) 62.5-25 mcg/actuation DsDv Inhale 1 puff into the lungs.     Family History     None        Tobacco Use    Smoking status: Current Every Day Smoker     Packs/day: 1.00     Types: Cigarettes    Smokeless tobacco: Never Used   Substance and Sexual Activity    Alcohol use: No    Drug use: No    Sexual activity: Not on file     Review of Systems   Constitutional: Negative for activity change, appetite change, chills and fever.   HENT: Negative for congestion, sinus pressure, sinus pain and sore throat.    Eyes: Negative for discharge and itching.   Respiratory: Negative for apnea, cough, chest tightness and shortness of breath.    Cardiovascular: Negative for chest pain, palpitations and leg swelling.   Gastrointestinal: Positive for abdominal pain and constipation. Negative for abdominal distention, anal bleeding, blood in stool, nausea, rectal pain and vomiting.   Endocrine: Negative for cold intolerance, heat intolerance and polyuria.   Genitourinary: Negative for difficulty urinating.   Musculoskeletal: Negative for arthralgias, back pain and gait problem.   Skin: Negative for color change.   Allergic/Immunologic: Negative for environmental allergies and food allergies.    Neurological: Negative for dizziness, weakness and light-headedness.   Hematological: Negative for adenopathy. Does not bruise/bleed easily.   Psychiatric/Behavioral: Negative for agitation, behavioral problems, self-injury and suicidal ideas.     Objective:     Vital Signs (Most Recent):  Temp: 98.5 °F (36.9 °C) (10/10/19 2200)  Pulse: 79 (10/10/19 2200)  Resp: 18 (10/10/19 2200)  BP: 135/85 (10/10/19 2200)  SpO2: (!) 92 % (10/10/19 2200) Vital Signs (24h Range):  Temp:  [98.2 °F (36.8 °C)-98.5 °F (36.9 °C)] 98.5 °F (36.9 °C)  Pulse:  [79-89] 79  Resp:  [16-20] 18  SpO2:  [92 %-97 %] 92 %  BP: (135-149)/(81-97) 135/85        Body mass index is 16.14 kg/m².    Physical Exam   Constitutional: He is oriented to person, place, and time. He appears well-developed and well-nourished.   HENT:   Head: Normocephalic and atraumatic.   Eyes: Pupils are equal, round, and reactive to light. EOM are normal.   Neck: Normal range of motion. Neck supple.   Cardiovascular: Normal rate and regular rhythm.   Pulmonary/Chest: Effort normal and breath sounds normal. No stridor. No respiratory distress. He has no rales.   Abdominal: He exhibits no distension and no mass. There is tenderness. There is guarding.   Firm, muscular; ostomy in place left sided; soft brown stool evident;  Well healed midline scar   Genitourinary:   Genitourinary Comments: deferred   Musculoskeletal: Normal range of motion. He exhibits no edema or deformity.   Neurological: He is alert and oriented to person, place, and time.   Skin: Skin is warm and dry. No rash noted. No erythema.   Psychiatric: He has a normal mood and affect. His behavior is normal. Judgment and thought content normal.         CRANIAL NERVES     CN III, IV, VI   Pupils are equal, round, and reactive to light.  Extraocular motions are normal.        Significant Labs:   BMP:   Recent Labs   Lab 10/10/19  1921   GLU 81      K 4.0      CO2 26   BUN 14   CREATININE 0.6   CALCIUM  8.5*     CBC:   Recent Labs   Lab 10/10/19  1921   WBC 9.78   HGB 8.6*   HCT 29.8*   *     CMP:   Recent Labs   Lab 10/10/19  1921      K 4.0      CO2 26   GLU 81   BUN 14   CREATININE 0.6   CALCIUM 8.5*   PROT 7.3   ALBUMIN 2.2*   BILITOT 0.3   ALKPHOS 66   AST 17   ALT 20   ANIONGAP 8   EGFRNONAA >60     Lactic Acid: No results for input(s): LACTATE in the last 48 hours.  Magnesium: No results for input(s): MG in the last 48 hours.  Troponin: No results for input(s): TROPONINI in the last 48 hours.    Significant Imaging:   Imaging Results          CT Abdomen Pelvis With Contrast (Final result)  Result time 10/10/19 21:08:02    Final result by Arturo Thornton MD (10/10/19 21:08:02)                 Impression:      Diffuse mesenteric edema is suspected.    Moderate constipation.    All CT scans at this facility use dose modulation, iterative reconstruction, and/or weight based dosing when appropriate to reduce radiation dose to as low as reasonable achievable.      Electronically signed by: Arturo Thornton MD  Date:    10/10/2019  Time:    21:08             Narrative:    EXAMINATION:  CT ABDOMEN PELVIS WITH CONTRAST    CLINICAL HISTORY:  Abd pain, fever, abscess suspected;    TECHNIQUE:  Low dose axial images, sagittal and coronal reformations were obtained from the lung bases to the pubic symphysis following the IV administration of 75 mL of Omnipaque 350.  Oral contrast was not administered.    COMPARISON:  09/30/2019    FINDINGS:  Heart: Normal size. No effusion.    Lung Bases: Clear.    Liver: Normal size and attenuation.  Scattered cysts are seen within the liver.  Small amount of perihepatic ascites noted.  Mild intrahepatic ductal dilatation; correlate with LFTs.  No evidence of choledocholithiasis.    Gallbladder: Mild gallbladder wall thickening which can be seen with intrinsic liver disease.    Bile Ducts: Mild intrahepatic ductal dilatation.    Pancreas: No mass. No peripancreatic fat  stranding.    Spleen: Mild splenomegaly.    Adrenals: Normal.    Kidneys/Ureters: Normal enhancement.  No mass or  hydroureteronephrosis.    Bladder: Moderate bladder distension.    Reproductive organs: Normal.    GI Tract/Mesentery: No evidence of bowel obstruction.  Ostomy is seen within the ventral abdomen.  Moderate constipation.    Mild mesenteric edema is seen.    Peritoneal Space: Scattered minimal ascites.  No free air.  No intra-abdominal abscess is identified.    Retroperitoneum: No significant adenopathy.    Abdominal wall: Mild diffuse anasarca.    Vasculature: No aneurysm.  Severe atherosclerotic disease.  Aorta bifem bypass graft appears patent.    Bones: No acute fracture.  Stable appearance of chronic compression deformity at L5 with mild retrolisthesis measuring 6 mm.  Posterior decompression is seen at L5 no suspicious lytic or sclerotic lesions.                              Assessment/Plan:     * Lower abdominal pain    Diffuse mesenteric edema supected on CT scan with moderated constipation  Will admit; clear liquid diet for now  Add miralax to his regimen  Lactulose tonight  Given dilaudid; will try toradol IV  GI to see    Constipation  Miralax added to the regimen  Lactulose prn  GI to see;  Avoid opiods;       COPD (chronic obstructive pulmonary disease)  Not in exacerbation  Supplemental oxygen if needed  Duonebs PRN  Smoking cessation advised        VTE Risk Mitigation (From admission, onward)         Ordered     Place LUANA hose  Until discontinued      10/10/19 2309     Place sequential compression device  Until discontinued      10/10/19 2309                   Carlos Rodriguez MD  Department of Hospital Medicine   [facility]

## 2019-10-11 NOTE — ED NOTES
Pt AAOx3, resting in bed, side rails up x 2, call bell within reach. Hospital medicine msg'd regarding pt's c/o pain and O2 orders.

## 2019-10-11 NOTE — HPI
58 YO WM presenting to the ED by way of EMS, complaining of lower abdominal pain, described as 10/10; gradual onset, with bouts occurring over the past several weeks;   Patient has history of ischemic colitis and is S/P sigmoid resection with end colostomy creation 7/3/19 at [facility].  He states he has presented there with like complaints but was discharged each time;  He denies N/V, theree is soft stool in the colostomy bag;  An abdominal CT scan tonight shows   diffuse mesenteric edema is suspected, with moderate constipation;  Patient prior history of heroin abuse, but denies recent use;  In the ED general surgery was consulted but did not feel this was a surgical issue;  GI agreed to see the patine in consultation and advised admission for pain control;

## 2019-10-11 NOTE — ASSESSMENT & PLAN NOTE
Diffuse mesenteric edema supected on CT scan with moderated constipation  Will admit; clear liquid diet for now  Add miralax to his regimen  Lactulose tonight  Given dilaudid; will try toradol IV  GI to see

## 2019-10-11 NOTE — ED NOTES
Pt walked out of the department and stated he did not want to sign an AMA form.  Hospital medicine notified.

## 2019-10-11 NOTE — ED NOTES
Pt reports abd pain from ostomy placement 4 mos ago. Pt reports changing his ostomy bag every 4 days. Pain is a 10/10. Pt denies taking pain meds at home. Reports it has hurt like this before. This time the pain started at 10a today.    Patient moved to ED room 2, patient assisted onto stretcher and changed into a gown. Patient placed on continuous pulse oximetry and automatic blood pressure cuff. Bed placed in low locked position, side rails up x 2, call light is within reach of patient or family, orientation to room and explanation of wait provided to family and patient, alarms set and turned on for monitor and pulse ox, awaiting MD evaluation and orders, will continue to monitor.    Patient identifies self as Arsalan Garcia Jr.      LOC: The patient is awake, alert and aware of environment with an appropriate affect, the patient is oriented x 3 and speaking appropriately.  APPEARANCE: Patient resting comfortably and in no acute distress, patient is clean and well groomed, patient's clothing is properly fastened.  SKIN: The skin is warm and dry, color consistent with ethnicity, patient has normal skin turgor and moist mucus membranes, skin intact, no breakdown or bruising noted. EX: colostomy to abd, generalized bruising to upper extremities  MUSCULOSKELETAL: Patient moving all extremities well, no obvious swelling or deformities noted.  RESPIRATORY: Airway is open and patent, respirations are spontaneous, patient has a normal effort and rate, no accessory muscle use noted.  CARDIAC: Patient has a normal rate, no peripheral edema noted, capillary refill < 3 seconds.  ABDOMEN: Soft and non tender to palpation, no distention noted.  NEUROLOGIC: PERRL, eyes open spontaneously, behavior appropriate to situation, follows commands, facial expression symmetrical, bilateral hand grasp equal and even, purposeful motor response noted, normal sensation in all extremities when touched with a finger.

## 2019-10-11 NOTE — DISCHARGE SUMMARY
[facility] Hospital Medicine  Discharge Summary      Patient Name: Arsalan Garcia Jr.  MRN: [id number]  Admission Date: 10/10/2019  Hospital Length of Stay: 0 days  Discharge Date and Time: No discharge date for patient encounter.  Attending Physician: No att. providers found   Discharging Provider: Rebecca Bojorquez MD  Primary Care Provider: Provider Notinsystem      HPI:   58 YO WM presenting to the ED by way of EMS, complaining of lower abdominal pain, described as 10/10; gradual onset, with bouts occurring over the past several weeks;   Patient has history of ischemic colitis and is S/P sigmoid resection with end colostomy creation 7/3/19 at [facility].  He states he has presented there with like complaints but was discharged each time;  He denies N/V, theree is soft stool in the colostomy bag;  An abdominal CT scan tonight shows   diffuse mesenteric edema is suspected, with moderate constipation;  Patient prior history of heroin abuse, but denies recent use;  In the ED general surgery was consulted but did not feel this was a surgical issue;  GI agreed to see the patine in consultation and advised admission for pain control;    * No surgery found *      Hospital Course:   Patient left against medical advice     Consults:   Consults (From admission, onward)        Status Ordering Provider     Inpatient consult to Gastroenterology  Once     Provider:  Estephania Callahan MD    Completed REBECCA DE LA CRUZ          No new Assessment & Plan notes have been filed under this hospital service since the last note was generated.  Service: Hospital Medicine    Final Active Diagnoses:      Problems Resolved During this Admission:    Diagnosis Date Noted Date Resolved POA    PRINCIPAL PROBLEM:  Lower abdominal pain [R10.30] 10/10/2019 10/11/2019 Yes    Constipation [K59.00] 10/10/2019 10/11/2019 Unknown    COPD (chronic obstructive pulmonary disease) [J44.9] 08/25/2014 10/11/2019 Yes       Discharged Condition:  against medical advice    Disposition: Left Against Medical Adv*    Follow Up:    Patient Instructions:   No discharge procedures on file.    Significant Diagnostic Studies: Labs:   BMP:   Recent Labs   Lab 10/10/19  1921 10/11/19  0532   GLU 81 82    136   K 4.0 4.1    102   CO2 26 28   BUN 14 12   CREATININE 0.6 0.6   CALCIUM 8.5* 8.7   MG  --  1.8   , CMP   Recent Labs   Lab 10/10/19  1921 10/11/19  0532    136   K 4.0 4.1    102   CO2 26 28   GLU 81 82   BUN 14 12   CREATININE 0.6 0.6   CALCIUM 8.5* 8.7   PROT 7.3  --    ALBUMIN 2.2*  --    BILITOT 0.3  --    ALKPHOS 66  --    AST 17  --    ALT 20  --    ANIONGAP 8 6*   ESTGFRAFRICA >60 >60   EGFRNONAA >60 >60   , CBC   Recent Labs   Lab 10/10/19  1921 10/11/19  0532   WBC 9.78 10.45   HGB 8.6* 9.2*   HCT 29.8* 31.9*   * 435*    and All labs within the past 24 hours have been reviewed    Pending Diagnostic Studies:     None         Medications:  Reconciled Home Medications:      Medication List      CONTINUE taking these medications    albuterol 90 mcg/actuation inhaler  Commonly known as:  PROVENTIL/VENTOLIN HFA  Inhale 2 puffs into the lungs every 6 (six) hours as needed.     atorvastatin 20 MG tablet  Commonly known as:  LIPITOR  Take 20 mg by mouth.     gabapentin 100 MG capsule  Commonly known as:  NEURONTIN  Take 200 mg by mouth 3 (three) times daily.     pantoprazole 40 MG tablet  Commonly known as:  PROTONIX  Take 40 mg by mouth once daily.     spironolactone 50 MG tablet  Commonly known as:  ALDACTONE  Take 1 tablet (50 mg total) by mouth once daily. For fluid retention     tamsulosin 0.4 mg Cap  Commonly known as:  FLOMAX  Take 0.4 mg by mouth once daily.     thiamine mononitrate (vit B1) 100 mg Tab  Commonly known as:  VITAMIN B-1 (MONONITRATE)  Take 100 mg by mouth.     umeclidinium-vilanterol 62.5-25 mcg/actuation Dsdv  Commonly known as:  ANORO ELLIPTA  Inhale 1 puff into the lungs.            Indwelling Lines/Drains  at time of discharge:   Lines/Drains/Airways     Drain                 Colostomy 07/01/19 1407  days                Time spent on the discharge of patient: 36 minutes  Patient was seen and examined on the date of discharge and determined to be suitable for discharge.         Carlos Bojorquez MD  Department of Hospital Medicine  [facility]

## 2019-10-12 VITALS
HEIGHT: 66 IN | DIASTOLIC BLOOD PRESSURE: 80 MMHG | OXYGEN SATURATION: 98 % | RESPIRATION RATE: 20 BRPM | HEART RATE: 83 BPM | BODY MASS INDEX: 17 KG/M2 | WEIGHT: 105.81 LBS | TEMPERATURE: 98 F | SYSTOLIC BLOOD PRESSURE: 139 MMHG

## 2019-10-12 NOTE — ED PROVIDER NOTES
SCRIBE #1 NOTE: ICasper, am scribing for, and in the presence of, Breonna Rios MD. I have scribed the entire note.       History     Chief Complaint   Patient presents with    Abdominal Pain     c/o abdominal pain and swelling to stoma/colostomy site. Sts was here yesterday and left AMA     Review of patient's allergies indicates:   Allergen Reactions    Asa [aspirin]     Nsaids (non-steroidal anti-inflammatory drug)     Tramadol          History of Present Illness     HPI    10/11/2019, 11:52 PM   History obtained from the patient      History of Present Illness: Arsalan Garcia Jr. is a 59 y.o. male patient with a PMHx of aortic aneurysm, COPD, GERD, and HTN who presents to the Emergency Department for evaluation of abd pain which onset gradually 1 days PTA. Pt was seen in the ED last night for similar sxs, but left AMA. Pt notes swelling and burning to colostomy site. Symptoms are constant and moderate in severity. No mitigating or exacerbating factors reported. Associated sxs include swelling to colostomy site, burning to colostomy site. Patient denies any fever, chills, SOB, CP, N/V, back pain, HA, weakness, dizziness, and all other sxs at this time. No prior Tx reported. No further complaints or concerns at this time.        Arrival mode: Personal vehicle      PCP: Provider Notinsystem        Past Medical History:  Past Medical History:   Diagnosis Date    Aorta aneurysm     COPD (chronic obstructive pulmonary disease)     GERD (gastroesophageal reflux disease)     History of necrotic bowel 07/2019    Hypercholesteremia     Hypertension     Peptic ulcer     Shoulder pain        Past Surgical History:  Past Surgical History:   Procedure Laterality Date    BACK SURGERY      COLECTOMY  07/2019    COLON SURGERY      COLOSTOMY  07/2019    LUNG BIOPSY      NECK SURGERY      peptic ulcer surgery           Family History:  History reviewed. No pertinent family history.       Social History:  Social History     Tobacco Use    Smoking status: Current Every Day Smoker     Packs/day: 1.00     Types: Cigarettes    Smokeless tobacco: Never Used   Substance and Sexual Activity    Alcohol use: No    Drug use: No    Sexual activity: Unknown        Review of Systems     Review of Systems   Constitutional: Negative for chills and fever.   HENT: Negative for sore throat.    Respiratory: Negative for shortness of breath.    Cardiovascular: Negative for chest pain.   Gastrointestinal: Positive for abdominal pain (swelling and burning to colostomy site). Negative for nausea and vomiting.   Genitourinary: Negative for dysuria.   Musculoskeletal: Negative for back pain.   Skin: Negative for rash.   Neurological: Negative for dizziness, weakness and headaches.   Hematological: Does not bruise/bleed easily.   All other systems reviewed and are negative.       Physical Exam     Initial Vitals [10/11/19 2323]   BP Pulse Resp Temp SpO2   (!) 141/92 91 18 97.3 °F (36.3 °C) 98 %      MAP       --          Physical Exam  Nursing Notes and Vital Signs Reviewed.  Constitutional: Patient is in mild distress. Well-developed and well-nourished.  Head: Atraumatic. Normocephalic.  Eyes: PERRL. EOM intact. Conjunctivae are not pale. No scleral icterus.  ENT: Mucous membranes are moist. Oropharynx is clear and symmetric.    Neck: Supple. Full ROM. No lymphadenopathy.  Cardiovascular: Regular rate. Regular rhythm. No murmurs, rubs, or gallops. Distal pulses are 2+ and symmetric.  Pulmonary/Chest: No respiratory distress. Clear to auscultation bilaterally. No wheezing or rales.  Abdominal: Soft and non-distended.  There is no tenderness.  No rebound, guarding, or rigidity. Colostomy site appears normal. No swelling noted to colostomy site.  Musculoskeletal: Moves all extremities. No obvious deformities. No edema. No calf tenderness.  Skin: Warm and dry.  Neurological:  Alert, awake, and appropriate.  Normal  "speech.  No acute focal neurological deficits are appreciated.  Psychiatric: Normal affect. Good eye contact. Appropriate in content.     ED Course   Procedures  ED Vital Signs:  Vitals:    10/11/19 2323 10/12/19 0000   BP: (!) 141/92 139/80   Pulse: 91 83   Resp: 18 20   Temp: 97.3 °F (36.3 °C) 97.5 °F (36.4 °C)   TempSrc: Oral    SpO2: 98% 98%   Weight: 48 kg (105 lb 13.1 oz)    Height: 5' 6" (1.676 m)        The Emergency Provider reviewed the vital signs and test results, which are outlined above.     ED Discussion     11:58 PM: Reassessed pt at this time.  Pt states his condition has improved at this time. Discussed with pt all pertinent ED information. Discussed pt dx and plan of tx. Gave pt all f/u and return to the ED instructions. All questions and concerns were addressed at this time. Pt expresses understanding of information and instructions, and is comfortable with plan to discharge. Pt is stable for discharge.      I discussed with patient and/or family/caretaker that evaluation in the ED does not suggest any emergent or life threatening medical conditions requiring immediate intervention beyond what was provided in the ED, and I believe patient is safe for discharge.  Regardless, an unremarkable evaluation in the ED does not preclude the development or presence of a serious of life threatening condition. As such, patient was instructed to return immediately for any worsening or change in current symptoms.        ED Medication(s):  Medications - No data to display    Discharge Medication List as of 10/11/2019 11:54 PM      START taking these medications    Details   cyclobenzaprine (FLEXERIL) 10 MG tablet Take 1 tablet (10 mg total) by mouth 3 (three) times daily as needed., Starting Fri 10/11/2019, Until Wed 10/16/2019, Normal             Follow-up Information     Schedule an appointment as soon as possible for a visit  with Provider Notinsystem.    Why:  As needed                             "       Scribe Attestation:   Scribe #1: I performed the above scribed service and the documentation accurately describes the services I performed. I attest to the accuracy of the note.     Attending:   Physician Attestation Statement for Scribe #1: I, Breonna Rios MD, personally performed the services described in this documentation, as scribed by Casper Diego, in my presence, and it is both accurate and complete.           Clinical Impression       ICD-10-CM ICD-9-CM   1. Abdominal pain, unspecified abdominal location R10.9 789.00       Disposition:   Disposition: Discharged  Condition: Stable         Breonna Rios MD  10/13/19 1952

## 2020-03-16 ENCOUNTER — HOSPITAL ENCOUNTER (EMERGENCY)
Facility: HOSPITAL | Age: 60
Discharge: HOME | End: 2020-03-16
Attending: EMERGENCY MEDICINE
Payer: MEDICAID

## 2020-03-16 VITALS
TEMPERATURE: 98 F | SYSTOLIC BLOOD PRESSURE: 161 MMHG | WEIGHT: 105 LBS | RESPIRATION RATE: 20 BRPM | OXYGEN SATURATION: 96 % | HEIGHT: 66 IN | BODY MASS INDEX: 16.88 KG/M2 | DIASTOLIC BLOOD PRESSURE: 89 MMHG | HEART RATE: 76 BPM

## 2020-03-16 DIAGNOSIS — I10 HYPERTENSION: ICD-10-CM

## 2020-03-16 DIAGNOSIS — Z72.0 TOBACCO ABUSE DISORDER: ICD-10-CM

## 2020-03-16 DIAGNOSIS — G89.29 ABDOMINAL PAIN, CHRONIC, LEFT LOWER QUADRANT: Primary | ICD-10-CM

## 2020-03-16 DIAGNOSIS — Z91.199 NONCOMPLIANCE: ICD-10-CM

## 2020-03-16 DIAGNOSIS — R10.32 ABDOMINAL PAIN, CHRONIC, LEFT LOWER QUADRANT: Primary | ICD-10-CM

## 2020-03-16 DIAGNOSIS — R10.9 ABDOMINAL PAIN: ICD-10-CM

## 2020-03-16 DIAGNOSIS — Z93.3 COLOSTOMY IN PLACE: ICD-10-CM

## 2020-03-16 DIAGNOSIS — F19.10 SUBSTANCE ABUSE: ICD-10-CM

## 2020-03-16 DIAGNOSIS — I10 ACCELERATED HYPERTENSION: ICD-10-CM

## 2020-03-16 LAB
ALBUMIN SERPL BCP-MCNC: 3.1 G/DL (ref 3.5–5.2)
ALP SERPL-CCNC: 106 U/L (ref 55–135)
ALT SERPL W/O P-5'-P-CCNC: 46 U/L (ref 10–44)
ANION GAP SERPL CALC-SCNC: 10 MMOL/L (ref 8–16)
AST SERPL-CCNC: 18 U/L (ref 10–40)
BASOPHILS # BLD AUTO: 0.07 K/UL (ref 0–0.2)
BASOPHILS NFR BLD: 0.6 % (ref 0–1.9)
BILIRUB SERPL-MCNC: 0.2 MG/DL (ref 0.1–1)
BUN SERPL-MCNC: 12 MG/DL (ref 6–20)
CALCIUM SERPL-MCNC: 8.1 MG/DL (ref 8.7–10.5)
CHLORIDE SERPL-SCNC: 102 MMOL/L (ref 95–110)
CO2 SERPL-SCNC: 27 MMOL/L (ref 23–29)
CREAT SERPL-MCNC: 0.6 MG/DL (ref 0.5–1.4)
DIFFERENTIAL METHOD: ABNORMAL
EOSINOPHIL # BLD AUTO: 0.1 K/UL (ref 0–0.5)
EOSINOPHIL NFR BLD: 1 % (ref 0–8)
ERYTHROCYTE [DISTWIDTH] IN BLOOD BY AUTOMATED COUNT: 18.7 % (ref 11.5–14.5)
EST. GFR  (AFRICAN AMERICAN): >60 ML/MIN/1.73 M^2
EST. GFR  (NON AFRICAN AMERICAN): >60 ML/MIN/1.73 M^2
GLUCOSE SERPL-MCNC: 85 MG/DL (ref 70–110)
HCT VFR BLD AUTO: 42.1 % (ref 40–54)
HGB BLD-MCNC: 12.4 G/DL (ref 14–18)
IMM GRANULOCYTES # BLD AUTO: 0.08 K/UL (ref 0–0.04)
IMM GRANULOCYTES NFR BLD AUTO: 0.6 % (ref 0–0.5)
LIPASE SERPL-CCNC: 129 U/L (ref 4–60)
LYMPHOCYTES # BLD AUTO: 2.5 K/UL (ref 1–4.8)
LYMPHOCYTES NFR BLD: 19.6 % (ref 18–48)
MCH RBC QN AUTO: 24.2 PG (ref 27–31)
MCHC RBC AUTO-ENTMCNC: 29.5 G/DL (ref 32–36)
MCV RBC AUTO: 82 FL (ref 82–98)
MONOCYTES # BLD AUTO: 0.9 K/UL (ref 0.3–1)
MONOCYTES NFR BLD: 7.4 % (ref 4–15)
NEUTROPHILS # BLD AUTO: 8.9 K/UL (ref 1.8–7.7)
NEUTROPHILS NFR BLD: 70.8 % (ref 38–73)
NRBC BLD-RTO: 0 /100 WBC
PLATELET # BLD AUTO: 378 K/UL (ref 150–350)
PMV BLD AUTO: 9.8 FL (ref 9.2–12.9)
POTASSIUM SERPL-SCNC: 3.4 MMOL/L (ref 3.5–5.1)
PROT SERPL-MCNC: 7.8 G/DL (ref 6–8.4)
RBC # BLD AUTO: 5.12 M/UL (ref 4.6–6.2)
SODIUM SERPL-SCNC: 139 MMOL/L (ref 136–145)
WBC # BLD AUTO: 12.63 K/UL (ref 3.9–12.7)

## 2020-03-16 PROCEDURE — 93005 ELECTROCARDIOGRAM TRACING: CPT

## 2020-03-16 PROCEDURE — 99284 EMERGENCY DEPT VISIT MOD MDM: CPT | Mod: 25

## 2020-03-16 PROCEDURE — 96361 HYDRATE IV INFUSION ADD-ON: CPT

## 2020-03-16 PROCEDURE — 85025 COMPLETE CBC W/AUTO DIFF WBC: CPT

## 2020-03-16 PROCEDURE — 96375 TX/PRO/DX INJ NEW DRUG ADDON: CPT

## 2020-03-16 PROCEDURE — 25000003 PHARM REV CODE 250: Performed by: EMERGENCY MEDICINE

## 2020-03-16 PROCEDURE — 83690 ASSAY OF LIPASE: CPT

## 2020-03-16 PROCEDURE — 96374 THER/PROPH/DIAG INJ IV PUSH: CPT

## 2020-03-16 PROCEDURE — 63600175 PHARM REV CODE 636 W HCPCS: Performed by: EMERGENCY MEDICINE

## 2020-03-16 PROCEDURE — 93010 ELECTROCARDIOGRAM REPORT: CPT | Mod: ,,, | Performed by: INTERNAL MEDICINE

## 2020-03-16 PROCEDURE — 80053 COMPREHEN METABOLIC PANEL: CPT

## 2020-03-16 PROCEDURE — 93010 EKG 12-LEAD: ICD-10-PCS | Mod: ,,, | Performed by: INTERNAL MEDICINE

## 2020-03-16 RX ORDER — LABETALOL HYDROCHLORIDE 5 MG/ML
20 INJECTION, SOLUTION INTRAVENOUS
Status: COMPLETED | OUTPATIENT
Start: 2020-03-16 | End: 2020-03-16

## 2020-03-16 RX ORDER — SODIUM CHLORIDE 9 MG/ML
1000 INJECTION, SOLUTION INTRAVENOUS
Status: COMPLETED | OUTPATIENT
Start: 2020-03-16 | End: 2020-03-16

## 2020-03-16 RX ORDER — MORPHINE SULFATE 4 MG/ML
4 INJECTION, SOLUTION INTRAMUSCULAR; INTRAVENOUS
Status: COMPLETED | OUTPATIENT
Start: 2020-03-16 | End: 2020-03-16

## 2020-03-16 RX ORDER — ONDANSETRON 2 MG/ML
4 INJECTION INTRAMUSCULAR; INTRAVENOUS
Status: COMPLETED | OUTPATIENT
Start: 2020-03-16 | End: 2020-03-16

## 2020-03-16 RX ADMIN — MORPHINE SULFATE 4 MG: 4 INJECTION, SOLUTION INTRAMUSCULAR; INTRAVENOUS at 05:03

## 2020-03-16 RX ADMIN — LABETALOL HYDROCHLORIDE 20 MG: 5 INJECTION INTRAVENOUS at 06:03

## 2020-03-16 RX ADMIN — SODIUM CHLORIDE 1000 ML: 0.9 INJECTION, SOLUTION INTRAVENOUS at 05:03

## 2020-03-16 RX ADMIN — ONDANSETRON 4 MG: 2 INJECTION INTRAMUSCULAR; INTRAVENOUS at 05:03

## 2020-03-16 NOTE — ED PROVIDER NOTES
SCRIBE #1 NOTE: I, Casper Diego, am scribing for, and in the presence of, Shanita Montgomery MD. I have scribed the entire note.       History     Chief Complaint   Patient presents with    Abdominal Pain     around stomax a few days     Review of patient's allergies indicates:   Allergen Reactions    Asa [aspirin]     Nsaids (non-steroidal anti-inflammatory drug)     Tramadol          History of Present Illness     HPI    3/16/2020, 5:08 PM   History obtained from the patient      History of Present Illness: Arsalan Garcia Jr. is a 60 y.o. male patient with a PMHx of HTN, hypercholesteremia, GERD, COPD, and aorta aneurysm who presents to the Emergency Department for evaluation of abd pain which onset gradually x 3 days ago. Pt states he had a colostomy done on 7/3/19 at [facility]. Symptoms are constant and moderate in severity. No mitigating or exacerbating factors reported. Associated sxs include increased weight loss. Patient denies any fever, chills, SOB, CP, N/V, back pain, HA, weakness, and all other sxs at this time. No prior Tx reported. Pt reports he has no GI or PCP currently and has not been taking his blood pressure medication and admits to heroin usage in the past. No further complaints or concerns at this time.        Arrival mode: EMS    PCP: [facility]        Past Medical History:  Past Medical History:   Diagnosis Date    Aorta aneurysm     COPD (chronic obstructive pulmonary disease)     GERD (gastroesophageal reflux disease)     History of necrotic bowel 07/2019    Hypercholesteremia     Hypertension     Peptic ulcer     Shoulder pain        Past Surgical History:  Past Surgical History:   Procedure Laterality Date    BACK SURGERY      COLECTOMY  07/2019    COLON SURGERY      COLOSTOMY  07/2019    LUNG BIOPSY      NECK SURGERY      peptic ulcer surgery           Family History:  History reviewed. No pertinent family history.      Social History:  Social History      Tobacco Use    Smoking status: Current Every Day Smoker     Packs/day: 1.00     Types: Cigarettes    Smokeless tobacco: Never Used   Substance and Sexual Activity    Alcohol use: No    Drug use: No    Sexual activity: Unknown         Review of Systems     Review of Systems   Constitutional: Positive for unexpected weight change (weight loss). Negative for chills and fever.   HENT: Negative for sore throat.    Respiratory: Negative for shortness of breath.    Cardiovascular: Negative for chest pain.   Gastrointestinal: Positive for abdominal pain. Negative for nausea and vomiting.   Genitourinary: Negative for dysuria.   Musculoskeletal: Negative for back pain.   Skin: Negative for rash.   Neurological: Negative for weakness and headaches.   Hematological: Does not bruise/bleed easily.   All other systems reviewed and are negative.       Physical Exam     Initial Vitals [03/16/20 1642]   BP Pulse Resp Temp SpO2   (!) 192/118 93 18 97.5 °F (36.4 °C) 100 %      MAP       --          Physical Exam  Nursing Notes and Vital Signs Reviewed.  Constitutional: Patient is in no acute distress. Well-developed and well-nourished.  Head: Atraumatic. Normocephalic.  Eyes: PERRL. EOM intact. Conjunctivae are not pale. No scleral icterus.  ENT: Mucous membranes are moist. Oropharynx is clear and symmetric.    Neck: Supple. Full ROM. No lymphadenopathy.  Cardiovascular: Regular rate. Regular rhythm. No murmurs, rubs, or gallops. Distal pulses are 2+ and symmetric.  Pulmonary/Chest: No respiratory distress. Clear to auscultation bilaterally. No wheezing or rales.  Abdominal: Soft and non-distended.  There is no tenderness.  No rebound, guarding, or rigidity. Good bowel sounds. Colostomy in place. No palpable masses, no bruit.   Genitourinary: No CVA tenderness  Musculoskeletal: Moves all extremities. No obvious deformities. No edema. No calf tenderness.  Skin: Warm and dry.  Neurological:  Alert, awake, and appropriate.   "Normal speech.  No acute focal neurological deficits are appreciated.  Psychiatric: Normal affect. Good eye contact. Appropriate in content.     ED Course   Procedures  ED Vital Signs:  Vitals:    03/16/20 1642 03/16/20 1829 03/16/20 1853 03/16/20 1856   BP: (!) 192/118 (!) 190/96  (!) 165/93   Pulse: 93 82 75 75   Resp: 18  20 19   Temp: 97.5 °F (36.4 °C)      SpO2: 100% (!) 93% 96% 96%   Weight: 47.6 kg (105 lb)      Height: 5' 6" (1.676 m)       03/16/20 1901   BP: (!) 161/89   Pulse: 76   Resp: 20   Temp:    SpO2: 96%   Weight:    Height:        Abnormal Lab Results:  Labs Reviewed   CBC W/ AUTO DIFFERENTIAL - Abnormal; Notable for the following components:       Result Value    Hemoglobin 12.4 (*)     Mean Corpuscular Hemoglobin 24.2 (*)     Mean Corpuscular Hemoglobin Conc 29.5 (*)     RDW 18.7 (*)     Platelets 378 (*)     Immature Granulocytes 0.6 (*)     Gran # (ANC) 8.9 (*)     Immature Grans (Abs) 0.08 (*)     All other components within normal limits   COMPREHENSIVE METABOLIC PANEL - Abnormal; Notable for the following components:    Potassium 3.4 (*)     Calcium 8.1 (*)     Albumin 3.1 (*)     ALT 46 (*)     All other components within normal limits   LIPASE - Abnormal; Notable for the following components:    Lipase 129 (*)     All other components within normal limits        All Lab Results:  Results for orders placed or performed during the hospital encounter of 03/16/20   CBC auto differential   Result Value Ref Range    WBC 12.63 3.90 - 12.70 K/uL    RBC 5.12 4.60 - 6.20 M/uL    Hemoglobin 12.4 (L) 14.0 - 18.0 g/dL    Hematocrit 42.1 40.0 - 54.0 %    Mean Corpuscular Volume 82 82 - 98 fL    Mean Corpuscular Hemoglobin 24.2 (L) 27.0 - 31.0 pg    Mean Corpuscular Hemoglobin Conc 29.5 (L) 32.0 - 36.0 g/dL    RDW 18.7 (H) 11.5 - 14.5 %    Platelets 378 (H) 150 - 350 K/uL    MPV 9.8 9.2 - 12.9 fL    Immature Granulocytes 0.6 (H) 0.0 - 0.5 %    Gran # (ANC) 8.9 (H) 1.8 - 7.7 K/uL    Immature Grans (Abs) " 0.08 (H) 0.00 - 0.04 K/uL    Lymph # 2.5 1.0 - 4.8 K/uL    Mono # 0.9 0.3 - 1.0 K/uL    Eos # 0.1 0.0 - 0.5 K/uL    Baso # 0.07 0.00 - 0.20 K/uL    nRBC 0 0 /100 WBC    Gran% 70.8 38.0 - 73.0 %    Lymph% 19.6 18.0 - 48.0 %    Mono% 7.4 4.0 - 15.0 %    Eosinophil% 1.0 0.0 - 8.0 %    Basophil% 0.6 0.0 - 1.9 %    Differential Method Automated    Comprehensive metabolic panel   Result Value Ref Range    Sodium 139 136 - 145 mmol/L    Potassium 3.4 (L) 3.5 - 5.1 mmol/L    Chloride 102 95 - 110 mmol/L    CO2 27 23 - 29 mmol/L    Glucose 85 70 - 110 mg/dL    BUN, Bld 12 6 - 20 mg/dL    Creatinine 0.6 0.5 - 1.4 mg/dL    Calcium 8.1 (L) 8.7 - 10.5 mg/dL    Total Protein 7.8 6.0 - 8.4 g/dL    Albumin 3.1 (L) 3.5 - 5.2 g/dL    Total Bilirubin 0.2 0.1 - 1.0 mg/dL    Alkaline Phosphatase 106 55 - 135 U/L    AST 18 10 - 40 U/L    ALT 46 (H) 10 - 44 U/L    Anion Gap 10 8 - 16 mmol/L    eGFR if African American >60 >60 mL/min/1.73 m^2    eGFR if non African American >60 >60 mL/min/1.73 m^2   Lipase   Result Value Ref Range    Lipase 129 (H) 4 - 60 U/L         Imaging Results:  Imaging Results          X-Ray Abdomen Flat And Erect (Final result)  Result time 03/16/20 18:26:31    Final result by OLGA Marshall Sr., MD (03/16/20 18:26:31)                 Impression:      1. There is a prominent amount of gas within the gastrointestinal system.  2. There are surgical clips projected over the abdomen.      Electronically signed by: Carson Marshall MD  Date:    03/16/2020  Time:    18:26             Narrative:    EXAMINATION:  XR ABDOMEN FLAT AND ERECT    CLINICAL HISTORY:  Unspecified abdominal pain    COMPARISON:  None    FINDINGS:  There is a prominent amount of gas within the gastrointestinal system.  There is no pneumoperitoneum. The bony structures appear intact.  There are surgical clips projected over the abdomen.                                 The EKG was ordered, reviewed, and independently interpreted by the ED  provider.  Interpretation time: 1659  Rate: 87 BPM  Rhythm: Atrial flutter with variable A-V block  Interpretation: No acute ST wave changes. No STEMI.           The Emergency Provider reviewed the vital signs and test results, which are outlined above.     ED Discussion     7:23 PM: Pt is A&O x3, appropriate, and of capacity at this time. Pt voices desire to leave hospital. D/w pt in length need for further evaluation and treatment due to HPI and PEx. Pt declines any further evaluation or tx at this time. All risks, including worsening sx, permanent bodily harm and death, were discussed in length. Pt acknowledges all risk at this time and agrees to sign AMA form. Pt given RTER instructions. All questions and concerns addressed at this time. Pt leaving AMA.      ED Medication(s):  Medications   labetaloL injection 20 mg (20 mg Intravenous Given 3/16/20 1832)   0.9%  NaCl infusion (0 mLs Intravenous Stopped 3/16/20 1918)   ondansetron injection 4 mg (4 mg Intravenous Given 3/16/20 1753)   morphine injection 4 mg (4 mg Intravenous Given 3/16/20 1757)       Discharge Medication List as of 3/16/2020  7:22 PM                    Medical Decision Making:   Clinical Tests:   Lab Tests: Ordered and Reviewed  Radiological Study: Ordered and Reviewed  Medical Tests: Ordered and Reviewed     Additional MDM:   Smoking Cessation: The patient is a smoker. The patient was counseled on smoking cessation for: 3 minutes. The patient was counseled on tobacco related  health complications. The patient was counseled on how exercise will aide in tobacco cessation. The patient was referred to a tobacco treatment program. Appropriate patient literature was given to the patient concerning tobacco cessation. The patient was counseled on the adverse effects of second hand smoke. The patient was counseled on hazards of smoking while driving.          Scribe Attestation:   Scribe #1: I performed the above scribed service and the documentation  accurately describes the services I performed. I attest to the accuracy of the note.     Attending:   Physician Attestation Statement for Scribe #1: I, Shanita Montgomery MD, personally performed the services described in this documentation, as scribed by Casper Diego, in my presence, and it is both accurate and complete.           Clinical Impression       ICD-10-CM ICD-9-CM   1. Abdominal pain, chronic, left lower quadrant R10.32 789.04    G89.29 338.29   2. Hypertension I10 401.9   3. Abdominal pain R10.9 789.00   4. Accelerated hypertension I10 401.0   5. Substance abuse F19.10 305.90   6. Noncompliance Z91.19 V15.81   7. Tobacco abuse disorder Z72.0 305.1   8. Colostomy in place Z93.3 V44.3       Disposition:   Disposition: ANGELA Montgomery MD  03/16/20 5354